# Patient Record
Sex: FEMALE | Race: WHITE | NOT HISPANIC OR LATINO | Employment: OTHER | URBAN - METROPOLITAN AREA
[De-identification: names, ages, dates, MRNs, and addresses within clinical notes are randomized per-mention and may not be internally consistent; named-entity substitution may affect disease eponyms.]

---

## 2017-01-11 ENCOUNTER — CLINICAL SUPPORT (OUTPATIENT)
Dept: CARDIOLOGY | Facility: CLINIC | Age: 67
End: 2017-01-11
Payer: MEDICARE

## 2017-01-11 DIAGNOSIS — Z95.810 AUTOMATIC IMPLANTABLE CARDIOVERTER-DEFIBRILLATOR IN SITU: ICD-10-CM

## 2017-01-11 DIAGNOSIS — I47.20 VENTRICULAR TACHYARRHYTHMIA: ICD-10-CM

## 2017-01-11 PROCEDURE — 93295 DEV INTERROG REMOTE 1/2/MLT: CPT | Mod: ,,, | Performed by: INTERNAL MEDICINE

## 2017-01-11 PROCEDURE — 93296 REM INTERROG EVL PM/IDS: CPT | Mod: PBBFAC,PO | Performed by: INTERNAL MEDICINE

## 2017-01-13 RX ORDER — SITAGLIPTIN AND METFORMIN HYDROCHLORIDE 500; 50 MG/1; MG/1
TABLET, FILM COATED ORAL
Qty: 60 TABLET | Refills: 11 | Status: SHIPPED | OUTPATIENT
Start: 2017-01-13 | End: 2017-06-21 | Stop reason: SDUPTHER

## 2017-01-13 NOTE — TELEPHONE ENCOUNTER
----- Message from Kassandra Doyle sent at 1/13/2017  1:12 PM CST -----  Contact: Patient  Inge, patient [phone], Calling for a refill on Rx Janumet 50/500 mg. Please advise. Thanks.

## 2017-01-22 RX ORDER — RAMIPRIL 10 MG/1
CAPSULE ORAL
Qty: 90 CAPSULE | Refills: 4 | Status: SHIPPED | OUTPATIENT
Start: 2017-01-22 | End: 2017-03-20 | Stop reason: SDUPTHER

## 2017-01-22 RX ORDER — FENOFIBRIC ACID 135 MG/1
CAPSULE, DELAYED RELEASE ORAL
Qty: 90 CAPSULE | Refills: 3 | Status: SHIPPED | OUTPATIENT
Start: 2017-01-22 | End: 2017-06-02 | Stop reason: SDUPTHER

## 2017-02-08 RX ORDER — METFORMIN HYDROCHLORIDE 750 MG/1
750 TABLET, EXTENDED RELEASE ORAL NIGHTLY
Qty: 90 TABLET | Refills: 3 | Status: SHIPPED | OUTPATIENT
Start: 2017-02-08 | End: 2018-05-24 | Stop reason: SDUPTHER

## 2017-02-08 RX ORDER — VERAPAMIL HYDROCHLORIDE 240 MG/1
TABLET, FILM COATED, EXTENDED RELEASE ORAL
Qty: 180 TABLET | Refills: 4 | Status: SHIPPED | OUTPATIENT
Start: 2017-02-08 | End: 2018-05-24 | Stop reason: SDUPTHER

## 2017-02-08 RX ORDER — METOPROLOL SUCCINATE 200 MG/1
TABLET, EXTENDED RELEASE ORAL
Qty: 180 TABLET | Refills: 4 | Status: SHIPPED | OUTPATIENT
Start: 2017-02-08 | End: 2019-05-02 | Stop reason: SDUPTHER

## 2017-02-15 ENCOUNTER — PATIENT OUTREACH (OUTPATIENT)
Dept: ADMINISTRATIVE | Facility: HOSPITAL | Age: 67
End: 2017-02-15

## 2017-02-15 NOTE — LETTER
February 15, 2017    Inge FARRAR Saleem  [address] [facility] [place] [address]             [facility]  [address]  [facility] [address]  Phone: [phone] Dear Mrs. Monge:    [facility] is committed to your overall health.  To help you get the most out of each of your visits, we will review your information to make sure you are up to date on all of your recommended tests and/or procedures.      Dr. Davis        has found that you may be due for:    Mammogram  Influenza vaccine    REMINDER:  lab appointment  2/27/17  Dr. Sneed (cardiology) on 5/9/17  at 11:00 am    Please bring your home blood pressure machine with you so that we may test for accuracy.     If you have had any of the above done at another facility, please bring the records or information with you so that your record at [facility] will be complete.     If you are currently taking medication , please bring it with you to your appointment for review.    If you have any questions or concerns, please don't hesitate to call.    Sincerely,  Leyda Mccall  Clinical Care Coordinator  [facility] [address] [facility] [address]  [place] [address]  Phone: [phone]   Fax: [phone]

## 2017-02-20 RX ORDER — FENOFIBRIC ACID 135 MG/1
CAPSULE, DELAYED RELEASE ORAL
Qty: 30 CAPSULE | Refills: 0 | Status: SHIPPED | OUTPATIENT
Start: 2017-02-20 | End: 2017-03-06 | Stop reason: SDUPTHER

## 2017-02-27 ENCOUNTER — LAB VISIT (OUTPATIENT)
Dept: LAB | Facility: HOSPITAL | Age: 67
End: 2017-02-27
Attending: FAMILY MEDICINE
Payer: MEDICARE

## 2017-02-27 DIAGNOSIS — E11.9 TYPE 2 DIABETES MELLITUS WITHOUT COMPLICATION, WITHOUT LONG-TERM CURRENT USE OF INSULIN: ICD-10-CM

## 2017-02-27 LAB
ALBUMIN SERPL BCP-MCNC: 3.9 G/DL
ALP SERPL-CCNC: 29 U/L
ALT SERPL W/O P-5'-P-CCNC: 19 U/L
ANION GAP SERPL CALC-SCNC: 8 MMOL/L
AST SERPL-CCNC: 23 U/L
BILIRUB SERPL-MCNC: 0.3 MG/DL
BUN SERPL-MCNC: 8 MG/DL
CALCIUM SERPL-MCNC: 8.7 MG/DL
CHLORIDE SERPL-SCNC: 109 MMOL/L
CHOLEST/HDLC SERPL: 5.6 {RATIO}
CO2 SERPL-SCNC: 25 MMOL/L
CREAT SERPL-MCNC: 0.7 MG/DL
EST. GFR  (AFRICAN AMERICAN): >60 ML/MIN/1.73 M^2
EST. GFR  (NON AFRICAN AMERICAN): >60 ML/MIN/1.73 M^2
GLUCOSE SERPL-MCNC: 120 MG/DL
HDL/CHOLESTEROL RATIO: 17.9 %
HDLC SERPL-MCNC: 195 MG/DL
HDLC SERPL-MCNC: 35 MG/DL
LDLC SERPL CALC-MCNC: ABNORMAL MG/DL
NONHDLC SERPL-MCNC: 160 MG/DL
POTASSIUM SERPL-SCNC: 3.9 MMOL/L
PROT SERPL-MCNC: 6.8 G/DL
SODIUM SERPL-SCNC: 142 MMOL/L
TRIGL SERPL-MCNC: 568 MG/DL

## 2017-02-27 PROCEDURE — 80061 LIPID PANEL: CPT

## 2017-02-27 PROCEDURE — 80053 COMPREHEN METABOLIC PANEL: CPT

## 2017-02-27 PROCEDURE — 83036 HEMOGLOBIN GLYCOSYLATED A1C: CPT

## 2017-02-27 PROCEDURE — 36415 COLL VENOUS BLD VENIPUNCTURE: CPT | Mod: PO

## 2017-02-28 LAB
ESTIMATED AVG GLUCOSE: 117 MG/DL
HBA1C MFR BLD HPLC: 5.7 %

## 2017-03-06 ENCOUNTER — OFFICE VISIT (OUTPATIENT)
Dept: FAMILY MEDICINE | Facility: CLINIC | Age: 67
End: 2017-03-06
Payer: MEDICARE

## 2017-03-06 VITALS
DIASTOLIC BLOOD PRESSURE: 70 MMHG | SYSTOLIC BLOOD PRESSURE: 110 MMHG | TEMPERATURE: 98 F | WEIGHT: 159.38 LBS | BODY MASS INDEX: 26.55 KG/M2 | HEIGHT: 65 IN | HEART RATE: 80 BPM | OXYGEN SATURATION: 95 %

## 2017-03-06 DIAGNOSIS — Z12.39 BREAST CANCER SCREENING: ICD-10-CM

## 2017-03-06 DIAGNOSIS — I10 ESSENTIAL HYPERTENSION: Primary | ICD-10-CM

## 2017-03-06 DIAGNOSIS — E78.5 HYPERLIPIDEMIA, UNSPECIFIED HYPERLIPIDEMIA TYPE: ICD-10-CM

## 2017-03-06 DIAGNOSIS — E11.9 TYPE 2 DIABETES MELLITUS WITHOUT COMPLICATION, WITHOUT LONG-TERM CURRENT USE OF INSULIN: ICD-10-CM

## 2017-03-06 DIAGNOSIS — I25.10 CORONARY ARTERY DISEASE INVOLVING NATIVE CORONARY ARTERY OF NATIVE HEART WITHOUT ANGINA PECTORIS: ICD-10-CM

## 2017-03-06 DIAGNOSIS — Z12.31 ENCOUNTER FOR SCREENING MAMMOGRAM FOR MALIGNANT NEOPLASM OF BREAST: ICD-10-CM

## 2017-03-06 PROCEDURE — 99999 PR PBB SHADOW E&M-EST. PATIENT-LVL III: CPT | Mod: PBBFAC,,, | Performed by: FAMILY MEDICINE

## 2017-03-06 PROCEDURE — 99214 OFFICE O/P EST MOD 30 MIN: CPT | Mod: S$PBB,,, | Performed by: FAMILY MEDICINE

## 2017-03-06 PROCEDURE — 99213 OFFICE O/P EST LOW 20 MIN: CPT | Mod: PBBFAC,PO | Performed by: FAMILY MEDICINE

## 2017-03-06 NOTE — MR AVS SNAPSHOT
[facility]  [address] [facility] [address]  [facility] [address]  Phone: [phone]  Fax: [phone]                  Inge Monge   3/6/2017 10:20 AM   Office Visit    Description:  Female : 1950   Provider:  Roverto Davis MD   Department:  [facility]           Reason for Visit     Diabetes     Hypertension     Cough           Diagnoses this Visit        Comments    Essential hypertension    -  Primary     Hyperlipidemia, unspecified hyperlipidemia type         Type 2 diabetes mellitus without complication, without long-term current use of insulin         Coronary artery disease involving native coronary artery of native heart without angina pectoris         Breast cancer screening         Encounter for screening mammogram for malignant neoplasm of breast                To Do List           Future Appointments        Provider Department Dept Phone    3/16/2017 10:30 AM [facility] MAMMO1 [facility] [phone]    2017 10:30 AM [facility]ER [facility] [phone]    2017 11:00 AM Rony Sneed MD [facility] [phone]    2017 8:10 AM LAB, [place] [facility] [phone]    2017 10:20 AM Roverto Davis MD [facility] [phone]      Goals (5 Years of Data)     None      Follow-Up and Disposition     Return in about 6 months (around 2017).    Follow-up and Disposition History      [facility]     [facility] Care [facility] -  Assistance  Registered nurses in the [facility] provide clinical advisement, health education, appointment booking, and other advisory services.  Call for this free service at [phone].             Medications           Message regarding Medications     Verify the changes and/or additions to your medication regime listed below are the same as discussed with your clinician today.  If any of these changes or  "additions are incorrect, please notify your healthcare provider.             Verify that the below list of medications is an accurate representation of the medications you are currently taking.  If none reported, the list may be blank. If incorrect, please contact your healthcare provider. Carry this list with you in case of emergency.           Current Medications     albuterol (VENTOLIN HFA) 90 mcg/actuation inhaler Inhale 1 puff into the lungs daily as needed.    butalbital-acetaminophen-caffeine -40 mg (FIORICET, ESGIC) -40 mg per tablet Take 1 tablet by mouth every 6 (six) hours as needed.    CONTOUR TEST STRIPS Strp TEST BLOOD SUGARS 4 TIMES DAILY    cyclobenzaprine (FLEXERIL) 10 MG tablet Take 1 tablet (10 mg total) by mouth every 8 (eight) hours.    fenofibric acid (FIBRICOR) 135 mg CpDR TAKE 1 CAPSULE BY MOUTH EVERY DAY    guaifenesin (MUCINEX) 600 mg 12 hr tablet Take 1,200 mg by mouth 2 (two) times daily.    JANUMET  mg per tablet TAKE 1 TABLET BY MOUTH TWICE DAILY    metformin (GLUCOPHAGE-XR) 750 MG 24 hr tablet Take 1 tablet (750 mg total) by mouth every evening.    metoprolol succinate (TOPROL-XL) 200 MG 24 hr tablet TAKE 1 TABLET(200 MG) BY MOUTH TWICE DAILY    pravastatin (PRAVACHOL) 20 MG tablet Take 1 tablet (20 mg total) by mouth every evening.    rabeprazole (ACIPHEX) 20 mg tablet TAKE 1 TABLET BY MOUTH DAILY    ramipril (ALTACE) 10 MG capsule TAKE 1 CAPSULE(10 MG) BY MOUTH EVERY DAY    ramipril (ALTACE) 10 MG capsule TAKE 1 CAPSULE(10 MG) BY MOUTH EVERY DAY    verapamil (CALAN-SR) 240 MG CR tablet TAKE 1 TABLET(240 MG) BY MOUTH TWICE DAILY    fluticasone (FLONASE) 50 mcg/actuation nasal spray 2 sprays by Each Nare route 2 (two) times daily.           Clinical Reference Information           Your Vitals Were     BP Pulse Temp Height Weight SpO2    110/70 (BP Location: Left arm, Patient Position: Sitting, BP Method: Manual) 80 97.7 °F (36.5 °C) (Oral) 5' 5" (1.651 m) 72.3 kg (159 " lb 6.3 oz) 95%    BMI                26.52 kg/m2          Blood Pressure          Most Recent Value    BP  110/70      Allergies as of 3/6/2017     Sulfa (Sulfonamide Antibiotics)      Immunizations Administered on Date of Encounter - 3/6/2017     None      Orders Placed During Today's Visit     Future Labs/Procedures Expected by Expires    Mammo Digital Screening Bilat with CAD  3/6/2017 5/6/2018    Comprehensive metabolic panel  9/2/2017 5/5/2018    Hemoglobin A1c  9/2/2017 3/6/2018    Lipid panel  9/2/2017 3/6/2018      [facility] Sign-Up     Activating your [facility] account is as easy as 1-2-3!     1) Visit [email or web address], select Sign Up Now, enter this activation code and your date of birth, then select Next.  [email or web address]  Expires: 4/20/2017 11:27 AM      2) Create a username and password to use when you visit [facility] in the future and select a security question in case you lose your password and select Next.    3) Enter your e-mail address and click Sign Up!    Additional Information  If you have questions, please e-mail [email or web address] or call [phone] to talk to our [facility] staff. Remember, [facility] is NOT to be used for urgent needs. For medical emergencies, dial 911.         Language Assistance Services     ATTENTION: Language assistance services are available, free of charge. Please call [phone].      ATENCIÓN: Si habla español, tiene a thomas disposición servicios gratuitos de asistencia lingüística. Llame al [phone].     CHÚ Ý: N?u b?n nói Ti?ng Vi?t, có các d?ch v? h? tr? ngôn ng? mi?n phí dành cho b?n. G?i s? [phone].         [facility] complies with applicable Federal civil rights laws and does not discriminate on the basis of race, color, national origin, age, disability, or sex.

## 2017-03-06 NOTE — PROGRESS NOTES
"Chief Complaint   Patient presents with    Diabetes     6 month follow up with labs prior    Hypertension    Cough     For about week       HISTORY OF PRESENT ILLNESS: This is a 66-year-old white female presents today as a 6 month followup regarding her diabetes.   She is recovering from URI over the last week. She is improving. Some central chest congestion.  DM2 - doing well on present regimen of janumet twice a day and metformin 750mg QHS; BG's 100; lost 14 pounds since last appt  HLD - tolerating Trilipix and Pravastatin 10mg daily  HTN - tolerating Ramipril 10mg daily , metoprolol and Verapamil; using lasix as needed but rarely  CAD - following with Dr. Masterson  COPD - stable off Advair and Spiriva; using albuterol   GERD - taking Aciphex daily    PAST MEDICAL HISTORY:   Type 2 diabetes, coronary artery disease, COPD,   hypertension, mitral valve regurgitation, hypertrophic cardiomyopathy s/p dual chamber,   hypertriglyceridemia, peptic ulcer disease, osteopenia, elevated CPK (worse with statins)sz and colon polyps.     REVIEW OF SYSTEMS: She denies any chest pain, shortness of breath,   dizziness, palpitations, and lower extremity swelling.   Occ sputum production    PHYSICAL EXAM:   /70 (BP Location: Left arm, Patient Position: Sitting, BP Method: Manual)  Pulse 80  Temp 97.7 °F (36.5 °C) (Oral)   Ht 5' 5" (1.651 m)  Wt 72.3 kg (159 lb 6.3 oz)  SpO2 95%  BMI 26.52 kg/m2  GENERAL: A healthy-appearing 66-year-old white female, who sitting   upright, in no apparent distress, alert and oriented x4.   Posterior oropharynx is clear.   NECK: Supple. There is no lymphadenopathy, thyromegaly or JVD.   CHEST: Clear to auscultation bilaterally with good respiratory movement.   CARDIOVASCULAR: S1 and S2. Regular rate and rhythm. No murmurs, rubs or gallops.   ABDOMEN: Benign.   EXTREMITIES: Show no cyanosis, clubbing or edema.     Results for orders placed or performed in visit on 02/27/17   Hemoglobin A1c "   Result Value Ref Range    Hemoglobin A1C 5.7 4.5 - 6.2 %    Estimated Avg Glucose 117 68 - 131 mg/dL   Comprehensive metabolic panel   Result Value Ref Range    Sodium 142 136 - 145 mmol/L    Potassium 3.9 3.5 - 5.1 mmol/L    Chloride 109 95 - 110 mmol/L    CO2 25 23 - 29 mmol/L    Glucose 120 (H) 70 - 110 mg/dL    BUN, Bld 8 8 - 23 mg/dL    Creatinine 0.7 0.5 - 1.4 mg/dL    Calcium 8.7 8.7 - 10.5 mg/dL    Total Protein 6.8 6.0 - 8.4 g/dL    Albumin 3.9 3.5 - 5.2 g/dL    Total Bilirubin 0.3 0.1 - 1.0 mg/dL    Alkaline Phosphatase 29 (L) 55 - 135 U/L    AST 23 10 - 40 U/L    ALT 19 10 - 44 U/L    Anion Gap 8 8 - 16 mmol/L    eGFR if African American >60.0 >60 mL/min/1.73 m^2    eGFR if non African American >60.0 >60 mL/min/1.73 m^2   Lipid panel   Result Value Ref Range    Cholesterol 195 120 - 199 mg/dL    Triglycerides 568 (H) 30 - 150 mg/dL    HDL 35 (L) 40 - 75 mg/dL    LDL Cholesterol Invalid, Trig>400.0 63.0 - 159.0 mg/dL    HDL/Chol Ratio 17.9 (L) 20.0 - 50.0 %    Total Cholesterol/HDL Ratio 5.6 (H) 2.0 - 5.0    Non-HDL Cholesterol 160 mg/dL     Essential hypertension    Hyperlipidemia, unspecified hyperlipidemia type    Type 2 diabetes mellitus without complication, without long-term current use of insulin  -     Hemoglobin A1c; Future; Expected date: 9/2/17  -     Comprehensive metabolic panel; Future; Expected date: 9/2/17  -     Lipid panel; Future; Expected date: 9/2/17    Coronary artery disease involving native coronary artery of native heart without angina pectoris    Breast cancer screening  -     Mammo Digital Screening Bilat with CAD; Future; Expected date: 3/6/17    Encounter for screening mammogram for malignant neoplasm of breast   -     Mammo Digital Screening Bilat with CAD; Future; Expected date: 3/6/17    Other orders  -     Cancel: Hepatitis C antibody; Future; Expected date: 3/6/17        F/u with pulmonology and cardiology as planned  continue Trilipix,  pravastatin 20mg daily; Krill  fish oil 1,000mg daily  Continue present meds  continue diabetic diet efforts and present meds  F/u 6 months with labs.

## 2017-03-20 ENCOUNTER — TELEPHONE (OUTPATIENT)
Dept: FAMILY MEDICINE | Facility: CLINIC | Age: 67
End: 2017-03-20

## 2017-03-20 RX ORDER — RAMIPRIL 10 MG/1
10 CAPSULE ORAL DAILY
Qty: 90 CAPSULE | Refills: 1 | Status: SHIPPED | OUTPATIENT
Start: 2017-03-20 | End: 2017-09-12 | Stop reason: SDUPTHER

## 2017-03-20 RX ORDER — PRAVASTATIN SODIUM 20 MG/1
20 TABLET ORAL NIGHTLY
Qty: 90 TABLET | Refills: 1 | Status: SHIPPED | OUTPATIENT
Start: 2017-03-20 | End: 2017-06-02 | Stop reason: SDUPTHER

## 2017-03-20 RX ORDER — PRAVASTATIN SODIUM 20 MG/1
TABLET ORAL
Qty: 31 TABLET | Refills: 0 | Status: SHIPPED | OUTPATIENT
Start: 2017-03-20 | End: 2018-10-26

## 2017-03-20 RX ORDER — RAMIPRIL 10 MG/1
CAPSULE ORAL
Qty: 30 CAPSULE | Refills: 0 | Status: SHIPPED | OUTPATIENT
Start: 2017-03-20 | End: 2018-02-08

## 2017-03-20 NOTE — TELEPHONE ENCOUNTER
----- Message from Dora Montague sent at 3/20/2017 12:30 PM CDT -----  Contact: Rolanda soriano/[facility] [employer] [phone]  They have a few more questions before they can complete the prior auth.  Please call her.  Thank you!

## 2017-03-20 NOTE — TELEPHONE ENCOUNTER
----- Message from Desiree Ortega sent at 3/20/2017 10:06 AM CDT -----  Contact: Patient  Patient called advising that she needs of refill of ramipril (ALTACE) 10 MG capsule and pravastatin (PRAVACHOL) 20 MG tablet sent to the 24 hour at [facility]- pharmacy phone number [phone].  Please call patient back at [phone] once refill has been sent over.  Thank you!

## 2017-03-22 RX ORDER — FENOFIBRIC ACID 135 MG/1
CAPSULE, DELAYED RELEASE ORAL
Qty: 30 CAPSULE | Refills: 0 | Status: SHIPPED | OUTPATIENT
Start: 2017-03-22 | End: 2017-04-20 | Stop reason: SDUPTHER

## 2017-04-10 ENCOUNTER — TELEPHONE (OUTPATIENT)
Dept: CARDIOLOGY | Facility: CLINIC | Age: 67
End: 2017-04-10

## 2017-04-10 RX ORDER — METOPROLOL SUCCINATE 200 MG/1
TABLET, EXTENDED RELEASE ORAL
Qty: 60 TABLET | Refills: 0 | Status: SHIPPED | OUTPATIENT
Start: 2017-04-10 | End: 2017-05-07 | Stop reason: SDUPTHER

## 2017-04-10 RX ORDER — VERAPAMIL HYDROCHLORIDE 240 MG/1
TABLET, FILM COATED, EXTENDED RELEASE ORAL
Qty: 60 TABLET | Refills: 0 | Status: SHIPPED | OUTPATIENT
Start: 2017-04-10 | End: 2017-05-07 | Stop reason: SDUPTHER

## 2017-04-10 NOTE — TELEPHONE ENCOUNTER
----- Message from Kassandra Doyle sent at 4/10/2017  2:18 PM CDT -----  Contact: Patient  Inge, patient [phone], Calling about Rx Varpamil and Metoporal.     [facility] [address] [place] [address]  Phone: [phone] Fax: [phone]    Pharmacy is claiming they do NOT have the Rx (stated it has been over a year since they have received a Rx) and patient needs this medication for her heart, today. Please advise. Thanks.

## 2017-04-10 NOTE — TELEPHONE ENCOUNTER
Left message for pt, notifying that she can [facility] and they will have her prescription on file and she can refill it at any [facility] she would like

## 2017-04-10 NOTE — TELEPHONE ENCOUNTER
----- Message from Julianna Colmenares sent at 4/10/2017  9:17 AM CDT -----  Contact: pt [phone]  Patient called for a refill the pharmacy sent the request to your office yesterday. She is out of the medications.

## 2017-04-10 NOTE — TELEPHONE ENCOUNTER
----- Message from Julianna Colmenares sent at 4/10/2017  9:33 AM CDT -----  Contact: pt [phone]  Patient called back and asked if you will call her prescription into [facility] [place] the [facility] they do not have a prescription.

## 2017-04-10 NOTE — TELEPHONE ENCOUNTER
Spoke with patient about refill request. Patient requested refill on metoprolol. Upon review of medications, pateint was notified she should have refills for the year. Patient understood and will call back if she has any problems.

## 2017-04-20 RX ORDER — FENOFIBRIC ACID 135 MG/1
CAPSULE, DELAYED RELEASE ORAL
Qty: 30 CAPSULE | Refills: 0 | Status: SHIPPED | OUTPATIENT
Start: 2017-04-20 | End: 2018-02-08

## 2017-05-08 RX ORDER — METOPROLOL SUCCINATE 200 MG/1
TABLET, EXTENDED RELEASE ORAL
Qty: 60 TABLET | Refills: 0 | Status: SHIPPED | OUTPATIENT
Start: 2017-05-08 | End: 2018-02-08

## 2017-05-08 RX ORDER — VERAPAMIL HYDROCHLORIDE 240 MG/1
TABLET, FILM COATED, EXTENDED RELEASE ORAL
Qty: 60 TABLET | Refills: 0 | Status: SHIPPED | OUTPATIENT
Start: 2017-05-08 | End: 2017-06-02 | Stop reason: SDUPTHER

## 2017-05-09 ENCOUNTER — OFFICE VISIT (OUTPATIENT)
Dept: CARDIOLOGY | Facility: CLINIC | Age: 67
End: 2017-05-09
Payer: MEDICARE

## 2017-05-09 ENCOUNTER — CLINICAL SUPPORT (OUTPATIENT)
Dept: CARDIOLOGY | Facility: CLINIC | Age: 67
End: 2017-05-09
Payer: MEDICARE

## 2017-05-09 VITALS
BODY MASS INDEX: 26.81 KG/M2 | DIASTOLIC BLOOD PRESSURE: 83 MMHG | WEIGHT: 160.94 LBS | SYSTOLIC BLOOD PRESSURE: 128 MMHG | HEART RATE: 77 BPM | HEIGHT: 65 IN

## 2017-05-09 DIAGNOSIS — I25.10 CORONARY ARTERY DISEASE INVOLVING NATIVE CORONARY ARTERY OF NATIVE HEART WITHOUT ANGINA PECTORIS: Primary | ICD-10-CM

## 2017-05-09 DIAGNOSIS — E11.9 TYPE 2 DIABETES MELLITUS WITHOUT COMPLICATION, WITHOUT LONG-TERM CURRENT USE OF INSULIN: ICD-10-CM

## 2017-05-09 DIAGNOSIS — E78.5 HYPERLIPIDEMIA, UNSPECIFIED HYPERLIPIDEMIA TYPE: ICD-10-CM

## 2017-05-09 DIAGNOSIS — I47.20 VENTRICULAR TACHYARRHYTHMIA: ICD-10-CM

## 2017-05-09 DIAGNOSIS — Z95.810 AUTOMATIC IMPLANTABLE CARDIOVERTER-DEFIBRILLATOR IN SITU: ICD-10-CM

## 2017-05-09 DIAGNOSIS — I10 ESSENTIAL HYPERTENSION: ICD-10-CM

## 2017-05-09 DIAGNOSIS — I42.2 HYPERTROPHIC CARDIOMYOPATHY: ICD-10-CM

## 2017-05-09 PROCEDURE — 99214 OFFICE O/P EST MOD 30 MIN: CPT | Mod: S$PBB,,, | Performed by: INTERNAL MEDICINE

## 2017-05-09 PROCEDURE — 93283 PRGRMG EVAL IMPLANTABLE DFB: CPT | Mod: PBBFAC,PO | Performed by: INTERNAL MEDICINE

## 2017-05-09 PROCEDURE — 99999 PR PBB SHADOW E&M-EST. PATIENT-LVL II: CPT | Mod: PBBFAC,,, | Performed by: INTERNAL MEDICINE

## 2017-05-09 PROCEDURE — 99212 OFFICE O/P EST SF 10 MIN: CPT | Mod: PBBFAC,PO,25 | Performed by: INTERNAL MEDICINE

## 2017-05-09 NOTE — PROGRESS NOTES
Subjective:    Patient ID:  Inge Monge is a 67 y.o. female who presents for follow-up of CAD    HPI  She comes with no complaints, no chest pain, no shortness of breath      Review of Systems   Constitution: Negative for decreased appetite, weakness, malaise/fatigue, weight gain and weight loss.   Cardiovascular: Negative for chest pain, dyspnea on exertion, leg swelling, palpitations and syncope.   Respiratory: Negative for cough and shortness of breath.    Gastrointestinal: Negative.    All other systems reviewed and are negative.       Objective:    Physical Exam   Constitutional: She is oriented to person, place, and time. She appears well-developed and well-nourished.   HENT:   Head: Normocephalic.   Eyes: Pupils are equal, round, and reactive to light.   Neck: Normal range of motion. Neck supple. No JVD present. Carotid bruit is not present. No thyromegaly present.   Cardiovascular: Normal rate, regular rhythm, intact distal pulses and normal pulses.  PMI is not displaced.  Exam reveals no gallop.    Murmur heard.  High-pitched blowing holosystolic murmur is present with a grade of 2/6  at the apex  Pulmonary/Chest: Effort normal and breath sounds normal.   Abdominal: Soft. Normal appearance. She exhibits no mass. There is no hepatosplenomegaly. There is no tenderness.   Musculoskeletal: Normal range of motion. She exhibits no edema.   Neurological: She is alert and oriented to person, place, and time. She has normal strength and normal reflexes. No sensory deficit.   Skin: Skin is warm and intact.   Psychiatric: She has a normal mood and affect.   Nursing note and vitals reviewed.        Assessment:       1. Coronary artery disease involving native coronary artery of native heart without angina pectoris    2. Hypertrophic cardiomyopathy    3. Essential hypertension    4. Hyperlipidemia, unspecified hyperlipidemia type    5. Type 2 diabetes mellitus without complication, without long-term current use of  insulin    6. Automatic implantable cardioverter-defibrillator in situ         Plan:     Continue all cardiac medications  Regular exercise program  9 m f/u with [facility]

## 2017-05-11 ENCOUNTER — TELEPHONE (OUTPATIENT)
Dept: CARDIOLOGY | Facility: CLINIC | Age: 67
End: 2017-05-11

## 2017-05-11 NOTE — TELEPHONE ENCOUNTER
----- Message from Krystyna Thornton sent at 5/11/2017 10:46 AM CDT -----  Patient was just in to see doctor and she is stating that she forgot to get the doctor to refill her prescriptions of Verapamil 240 mg and Metoprolol 200 mg. She is calling pharmacy to send the request but she is out of her medication. She is just making sure that office refills it as soon as possible. Please call back after completion at [phone]. Please remit to:      [email or web address] [id number] [place] [facility]  [address] [facility] [address]  [facility] [place] [address]  Phone: [phone] Fax: [phone]

## 2017-06-02 NOTE — TELEPHONE ENCOUNTER
----- Message from Anjali Lees sent at 6/2/2017 10:29 AM CDT -----  Contact: [facility]#[phone]    Calling to  Discuss  The    meds  Refills  On /fdnofridric 135  Mg //verapamil er 240  Mg //metoprolol er succinate 200 mg // please call   [id number] - [place] [address]  [address] [facility] [address]  [address] [place] [address]  Phone: [phone] Fax: [phone]

## 2017-06-04 RX ORDER — METOPROLOL SUCCINATE 200 MG/1
TABLET, EXTENDED RELEASE ORAL
Qty: 60 TABLET | Refills: 0 | Status: SHIPPED | OUTPATIENT
Start: 2017-06-04 | End: 2017-07-02 | Stop reason: SDUPTHER

## 2017-06-04 RX ORDER — VERAPAMIL HYDROCHLORIDE 240 MG/1
TABLET, FILM COATED, EXTENDED RELEASE ORAL
Qty: 60 TABLET | Refills: 0 | Status: SHIPPED | OUTPATIENT
Start: 2017-06-04 | End: 2017-07-02 | Stop reason: SDUPTHER

## 2017-06-04 RX ORDER — FENOFIBRIC ACID 135 MG/1
1 CAPSULE, DELAYED RELEASE ORAL DAILY
Qty: 90 CAPSULE | Refills: 3 | Status: SHIPPED | OUTPATIENT
Start: 2017-06-04 | End: 2018-05-24 | Stop reason: SDUPTHER

## 2017-06-04 RX ORDER — PRAVASTATIN SODIUM 20 MG/1
20 TABLET ORAL NIGHTLY
Qty: 90 TABLET | Refills: 1 | Status: SHIPPED | OUTPATIENT
Start: 2017-06-04 | End: 2018-02-08

## 2017-06-12 RX ORDER — CYCLOBENZAPRINE HCL 10 MG
TABLET ORAL
Qty: 90 TABLET | Refills: 0 | Status: SHIPPED | OUTPATIENT
Start: 2017-06-12 | End: 2017-07-13 | Stop reason: SDUPTHER

## 2017-07-05 RX ORDER — VERAPAMIL HYDROCHLORIDE 240 MG/1
TABLET, FILM COATED, EXTENDED RELEASE ORAL
Qty: 60 TABLET | Refills: 0 | Status: SHIPPED | OUTPATIENT
Start: 2017-07-05 | End: 2017-08-13 | Stop reason: SDUPTHER

## 2017-07-05 RX ORDER — METOPROLOL SUCCINATE 200 MG/1
TABLET, EXTENDED RELEASE ORAL
Qty: 60 TABLET | Refills: 0 | Status: SHIPPED | OUTPATIENT
Start: 2017-07-05 | End: 2017-08-13 | Stop reason: SDUPTHER

## 2017-07-13 RX ORDER — CYCLOBENZAPRINE HCL 10 MG
TABLET ORAL
Qty: 90 TABLET | Refills: 5 | Status: SHIPPED | OUTPATIENT
Start: 2017-07-13 | End: 2018-01-16 | Stop reason: SDUPTHER

## 2017-08-10 ENCOUNTER — DOCUMENTATION ONLY (OUTPATIENT)
Dept: CARDIOLOGY | Facility: CLINIC | Age: 67
End: 2017-08-10

## 2017-08-10 DIAGNOSIS — I42.2 HYPERTROPHIC CARDIOMYOPATHY: Primary | ICD-10-CM

## 2017-08-10 DIAGNOSIS — I10 ESSENTIAL HYPERTENSION: ICD-10-CM

## 2017-08-10 DIAGNOSIS — I25.10 CORONARY ARTERY DISEASE INVOLVING NATIVE CORONARY ARTERY OF NATIVE HEART WITHOUT ANGINA PECTORIS: ICD-10-CM

## 2017-08-10 NOTE — PROGRESS NOTES
Received alert via home monitor for NSVT.  IEGM showed VT ~151bpm on 8/9/17 02:02, lasting 20+ sec.   Spoke w/ patient, she is not sure if she got up during the night, but was not aware of anything out of the ordinary.  However, she did not feel well yesterday and today (stomach ache & diarrhea).  RV pacing has increased from 0% to 51% since last check in May '17.  Reviewed w/ Dr. Sneed, will have patient scheduled for Jennifer scan and echo then consult with Dr. Medina.  Pt notified and verbalized understanding.  Information to Nadege for scheduling.

## 2017-08-14 RX ORDER — VERAPAMIL HYDROCHLORIDE 240 MG/1
TABLET, FILM COATED, EXTENDED RELEASE ORAL
Qty: 60 TABLET | Refills: 0 | Status: SHIPPED | OUTPATIENT
Start: 2017-08-14 | End: 2017-09-12 | Stop reason: SDUPTHER

## 2017-08-14 RX ORDER — METOPROLOL SUCCINATE 200 MG/1
TABLET, EXTENDED RELEASE ORAL
Qty: 60 TABLET | Refills: 0 | Status: SHIPPED | OUTPATIENT
Start: 2017-08-14 | End: 2017-09-12 | Stop reason: SDUPTHER

## 2017-08-15 ENCOUNTER — CLINICAL SUPPORT (OUTPATIENT)
Dept: CARDIOLOGY | Facility: CLINIC | Age: 67
End: 2017-08-15
Payer: MEDICARE

## 2017-08-15 DIAGNOSIS — I47.20 VENTRICULAR TACHYARRHYTHMIA: ICD-10-CM

## 2017-08-15 DIAGNOSIS — Z95.810 AUTOMATIC IMPLANTABLE CARDIOVERTER-DEFIBRILLATOR IN SITU: ICD-10-CM

## 2017-08-15 DIAGNOSIS — Z95.810 AUTOMATIC IMPLANTABLE CARDIOVERTER-DEFIBRILLATOR IN SITU: Primary | ICD-10-CM

## 2017-08-15 PROCEDURE — 93295 DEV INTERROG REMOTE 1/2/MLT: CPT | Mod: ,,, | Performed by: INTERNAL MEDICINE

## 2017-08-15 PROCEDURE — 93296 REM INTERROG EVL PM/IDS: CPT | Mod: PBBFAC,PO | Performed by: INTERNAL MEDICINE

## 2017-08-22 ENCOUNTER — PATIENT OUTREACH (OUTPATIENT)
Dept: ADMINISTRATIVE | Facility: HOSPITAL | Age: 67
End: 2017-08-22

## 2017-08-22 NOTE — LETTER
August 22, 2017    Inge Monge  [address]  [place] [address]             [facility]  [address]  [facility] [address]  Phone: [phone] Dear Ms. Monge:    [facility] committed to your overall health.  To help you get the most out of each of your visits, we will review your information to make sure you are up to date on all of your recommended tests and/or procedures.      Dr. Davis       has found that you may be due for:    colonoscopy  Mammogram  Annual Dilated Eye Exam  Diabetic Foot Exam    REMINDER:  lab appointment  8/30/17    If you have had any of the above done at another facility, please bring the records or information with you so that your record at [facility] will be complete.     If you are currently taking medication, please bring it with you to your appointment for review.    If you have any questions or concerns, please don't hesitate to call.    Sincerely,    Leyda Mccall  Clinical Care Coordinator  [facility]  [address] [facility] [address]  [place] [address]  Phone: [phone]   Fax: [phone]

## 2017-09-08 RX ORDER — RABEPRAZOLE SODIUM 20 MG/1
TABLET, DELAYED RELEASE ORAL
Qty: 90 TABLET | Refills: 3 | Status: SHIPPED | OUTPATIENT
Start: 2017-09-08 | End: 2018-09-26 | Stop reason: SDUPTHER

## 2017-09-12 RX ORDER — METOPROLOL SUCCINATE 200 MG/1
TABLET, EXTENDED RELEASE ORAL
Qty: 60 TABLET | Refills: 0 | Status: SHIPPED | OUTPATIENT
Start: 2017-09-12 | End: 2017-10-11 | Stop reason: SDUPTHER

## 2017-09-12 RX ORDER — RAMIPRIL 10 MG/1
CAPSULE ORAL
Qty: 90 CAPSULE | Refills: 0 | Status: SHIPPED | OUTPATIENT
Start: 2017-09-12 | End: 2017-12-10 | Stop reason: SDUPTHER

## 2017-09-12 RX ORDER — VERAPAMIL HYDROCHLORIDE 240 MG/1
TABLET, FILM COATED, EXTENDED RELEASE ORAL
Qty: 60 TABLET | Refills: 0 | Status: SHIPPED | OUTPATIENT
Start: 2017-09-12 | End: 2017-10-11 | Stop reason: SDUPTHER

## 2017-09-18 RX ORDER — BUTALBITAL, ACETAMINOPHEN AND CAFFEINE 50; 325; 40 MG/1; MG/1; MG/1
TABLET ORAL
Qty: 120 TABLET | Refills: 1 | Status: SHIPPED | OUTPATIENT
Start: 2017-09-18 | End: 2017-11-19 | Stop reason: SDUPTHER

## 2017-10-11 RX ORDER — VERAPAMIL HYDROCHLORIDE 240 MG/1
TABLET, FILM COATED, EXTENDED RELEASE ORAL
Qty: 180 TABLET | Refills: 0 | Status: SHIPPED | OUTPATIENT
Start: 2017-10-11 | End: 2018-02-05 | Stop reason: SDUPTHER

## 2017-10-11 RX ORDER — METOPROLOL SUCCINATE 200 MG/1
TABLET, EXTENDED RELEASE ORAL
Qty: 180 TABLET | Refills: 0 | Status: SHIPPED | OUTPATIENT
Start: 2017-10-11 | End: 2018-02-05 | Stop reason: SDUPTHER

## 2017-11-19 RX ORDER — BUTALBITAL, ACETAMINOPHEN AND CAFFEINE 50; 325; 40 MG/1; MG/1; MG/1
TABLET ORAL
Qty: 120 TABLET | Refills: 5 | Status: SHIPPED | OUTPATIENT
Start: 2017-11-19 | End: 2019-03-27 | Stop reason: SDUPTHER

## 2017-12-11 RX ORDER — RAMIPRIL 10 MG/1
CAPSULE ORAL
Qty: 90 CAPSULE | Refills: 0 | Status: SHIPPED | OUTPATIENT
Start: 2017-12-11 | End: 2018-02-08

## 2017-12-18 ENCOUNTER — INITIAL CONSULT (OUTPATIENT)
Dept: CARDIOLOGY | Facility: CLINIC | Age: 67
End: 2017-12-18
Payer: MEDICARE

## 2017-12-18 ENCOUNTER — CLINICAL SUPPORT (OUTPATIENT)
Dept: CARDIOLOGY | Facility: CLINIC | Age: 67
End: 2017-12-18
Attending: INTERNAL MEDICINE
Payer: MEDICARE

## 2017-12-18 VITALS
BODY MASS INDEX: 26.81 KG/M2 | SYSTOLIC BLOOD PRESSURE: 141 MMHG | DIASTOLIC BLOOD PRESSURE: 78 MMHG | WEIGHT: 160.94 LBS | HEART RATE: 72 BPM | HEIGHT: 65 IN

## 2017-12-18 DIAGNOSIS — I47.20 VENTRICULAR TACHYARRHYTHMIA: ICD-10-CM

## 2017-12-18 DIAGNOSIS — Z95.810 AUTOMATIC IMPLANTABLE CARDIOVERTER-DEFIBRILLATOR IN SITU: ICD-10-CM

## 2017-12-18 DIAGNOSIS — I42.2 HYPERTROPHIC CARDIOMYOPATHY: ICD-10-CM

## 2017-12-18 DIAGNOSIS — I47.20 VENTRICULAR TACHYCARDIA: Primary | ICD-10-CM

## 2017-12-18 DIAGNOSIS — I10 ESSENTIAL HYPERTENSION: ICD-10-CM

## 2017-12-18 DIAGNOSIS — E11.9 TYPE 2 DIABETES MELLITUS WITHOUT RETINOPATHY: ICD-10-CM

## 2017-12-18 PROCEDURE — 93283 PRGRMG EVAL IMPLANTABLE DFB: CPT | Mod: PBBFAC,PO | Performed by: INTERNAL MEDICINE

## 2017-12-18 PROCEDURE — 99204 OFFICE O/P NEW MOD 45 MIN: CPT | Mod: S$PBB,,, | Performed by: INTERNAL MEDICINE

## 2017-12-18 PROCEDURE — 93005 ELECTROCARDIOGRAM TRACING: CPT | Mod: PBBFAC,PO | Performed by: INTERNAL MEDICINE

## 2017-12-18 PROCEDURE — 99213 OFFICE O/P EST LOW 20 MIN: CPT | Mod: PBBFAC,PO,25 | Performed by: INTERNAL MEDICINE

## 2017-12-18 PROCEDURE — 99999 PR PBB SHADOW E&M-EST. PATIENT-LVL III: CPT | Mod: PBBFAC,,, | Performed by: INTERNAL MEDICINE

## 2017-12-18 PROCEDURE — 93010 ELECTROCARDIOGRAM REPORT: CPT | Mod: S$PBB,,, | Performed by: INTERNAL MEDICINE

## 2017-12-18 NOTE — LETTER
December 18, 2017      Rony Sneed MD  [address] [facility] [address] [place] [address]           [facility]  [address] [facility] [address] [place] [address]  Phone: [phone]          Patient: Inge Monge   MR Number: [id number]   YOB: 1950   Date of Visit: 12/18/2017       Dear Dr. Rony Sened:    Thank you for referring Inge Monge to me for evaluation. Attached you will find relevant portions of my assessment and plan of care.    If you have questions, please do not hesitate to call me. I look forward to following Inge Monge along with you.    Sincerely,    Stiven Medina MD    Enclosure  CC:  No Recipients    If you would like to receive this communication electronically, please contact [email or web address] or [phone] to request more information on [employer] Link access.    For providers and/or their staff who would like to refer a patient to [facility], please contact us through our one-stop-shop provider referral line, [facility], at [phone].    If you feel you have received this communication in error or would no longer like to receive these types of communications, please e-mail [email or web address]

## 2017-12-18 NOTE — PROGRESS NOTES
Subjective:    Patient ID:  Inge Monge is a 67 y.o. female who presents for follow-up of Consult (Ref by Dr. Sneed - NSVT/RV pacing )      HPI 66 yo female with Hypertrophic cardiomyopathy, Htn, DM, ICD.  Primary cardiologist is Dr. Sneed.  Has seen Dr. Bernal in the past.  Carries diagnosis of hypertrophic cardiomyopathy.  Echo's have reported normal biventricular structure and function, last one 3/16.  Has ICD for primary prevention.  Device interrogations have recently revealed increase in % RV pacing to 97%, with progression to complete AV block. One episode of VT 8/9  at 151 bpm, lasting 18 seconds.  Has noted increasing fatigue over the past 3 months. Denies syncope, palpitations, shortness of breath.    Review of Systems   Constitution: Positive for malaise/fatigue. Negative for weakness.   Cardiovascular: Negative for chest pain, dyspnea on exertion, irregular heartbeat, leg swelling, near-syncope, orthopnea, palpitations, paroxysmal nocturnal dyspnea and syncope.   Respiratory: Negative for cough and shortness of breath.    Neurological: Negative for dizziness and light-headedness.   All other systems reviewed and are negative.       Objective:    Physical Exam   Constitutional: She is oriented to person, place, and time. She appears well-developed and well-nourished.   Eyes: Conjunctivae are normal. No scleral icterus.   Neck: No JVD present. No tracheal deviation present.   Cardiovascular: Normal rate and regular rhythm.  PMI is not displaced.    Pulmonary/Chest: Effort normal and breath sounds normal. No respiratory distress.   Abdominal: Soft. There is no hepatosplenomegaly. There is no tenderness.   Musculoskeletal: She exhibits no edema or tenderness.   Neurological: She is alert and oriented to person, place, and time.   Skin: Skin is warm and dry. No rash noted.   Psychiatric: She has a normal mood and affect. Her behavior is normal.         Assessment:       1. Ventricular tachycardia     2. Hypertrophic cardiomyopathy    3. Essential hypertension    4. Automatic implantable cardioverter-defibrillator in situ    5. Type 2 diabetes mellitus without retinopathy - Both Eyes         Plan:           One episode of monomorphic VT, lasting 18 seconds, asymptomatic.  There is reports of hypertrophic cardiomyopathy in the notes, but her echo's are being read as having no thickness.  I would like for her to see Dr. Priyanka Ott to assess whether she has HOCM, and if she does, if there is anything to do.  Given chronic RV pacing and increasing fatigue >> echo with color flow.  Given she had one episode of non sustained VT, and this was asymptomatic >> would defer addition of anti-arrhythmic.  Will lower monitor zone.  F/u in 6 months.

## 2017-12-19 DIAGNOSIS — I47.20 VENTRICULAR TACHYCARDIA: Primary | ICD-10-CM

## 2017-12-19 DIAGNOSIS — Q24.9 ADULT CONGENITAL HEART DISEASE: Primary | ICD-10-CM

## 2017-12-19 DIAGNOSIS — I50.9 CONGESTIVE HEART FAILURE, UNSPECIFIED CONGESTIVE HEART FAILURE CHRONICITY, UNSPECIFIED CONGESTIVE HEART FAILURE TYPE: ICD-10-CM

## 2018-01-08 ENCOUNTER — TELEPHONE (OUTPATIENT)
Dept: FAMILY MEDICINE | Facility: CLINIC | Age: 68
End: 2018-01-08

## 2018-01-08 NOTE — TELEPHONE ENCOUNTER
----- Message from Dora Webster sent at 1/8/2018 12:13 PM CST -----  Contact: self   Patient needs to get a copy of her medications and she will pick it up today   Please put with  so she can pick them up

## 2018-01-17 RX ORDER — CYCLOBENZAPRINE HCL 10 MG
TABLET ORAL
Qty: 90 TABLET | Refills: 5 | Status: SHIPPED | OUTPATIENT
Start: 2018-01-17 | End: 2019-03-27 | Stop reason: SDUPTHER

## 2018-02-05 RX ORDER — METOPROLOL SUCCINATE 200 MG/1
TABLET, EXTENDED RELEASE ORAL
Qty: 180 TABLET | Refills: 0 | Status: SHIPPED | OUTPATIENT
Start: 2018-02-05 | End: 2018-02-08

## 2018-02-05 RX ORDER — VERAPAMIL HYDROCHLORIDE 240 MG/1
TABLET, FILM COATED, EXTENDED RELEASE ORAL
Qty: 180 TABLET | Refills: 0 | Status: SHIPPED | OUTPATIENT
Start: 2018-02-05 | End: 2018-02-08

## 2018-02-08 ENCOUNTER — HOSPITAL ENCOUNTER (OUTPATIENT)
Dept: CARDIOLOGY | Facility: CLINIC | Age: 68
Discharge: HOME | End: 2018-02-08
Attending: INTERNAL MEDICINE
Payer: MEDICARE

## 2018-02-08 ENCOUNTER — INITIAL CONSULT (OUTPATIENT)
Dept: CARDIOLOGY | Facility: CLINIC | Age: 68
End: 2018-02-08
Payer: MEDICARE

## 2018-02-08 VITALS
SYSTOLIC BLOOD PRESSURE: 153 MMHG | OXYGEN SATURATION: 95 % | WEIGHT: 161.81 LBS | HEIGHT: 66 IN | DIASTOLIC BLOOD PRESSURE: 95 MMHG | BODY MASS INDEX: 26.01 KG/M2 | HEART RATE: 79 BPM

## 2018-02-08 DIAGNOSIS — I47.20 VENTRICULAR TACHYCARDIA: ICD-10-CM

## 2018-02-08 DIAGNOSIS — I42.2 HYPERTROPHIC CARDIOMYOPATHY: Primary | ICD-10-CM

## 2018-02-08 DIAGNOSIS — E78.5 HYPERLIPIDEMIA, UNSPECIFIED HYPERLIPIDEMIA TYPE: ICD-10-CM

## 2018-02-08 DIAGNOSIS — I10 ESSENTIAL HYPERTENSION: ICD-10-CM

## 2018-02-08 DIAGNOSIS — I25.10 CORONARY ARTERY DISEASE INVOLVING NATIVE CORONARY ARTERY OF NATIVE HEART WITHOUT ANGINA PECTORIS: ICD-10-CM

## 2018-02-08 DIAGNOSIS — Z95.810 AUTOMATIC IMPLANTABLE CARDIOVERTER-DEFIBRILLATOR IN SITU: ICD-10-CM

## 2018-02-08 DIAGNOSIS — I42.2 HYPERTROPHIC CARDIOMYOPATHY: ICD-10-CM

## 2018-02-08 DIAGNOSIS — E11.9 TYPE 2 DIABETES MELLITUS WITHOUT COMPLICATION, WITHOUT LONG-TERM CURRENT USE OF INSULIN: ICD-10-CM

## 2018-02-08 LAB
AORTIC VALVE REGURGITATION: NORMAL
ESTIMATED PA SYSTOLIC PRESSURE: 31.14
MITRAL VALVE REGURGITATION: NORMAL
RETIRED EF AND QEF - SEE NOTES: 50 (ref 55–65)
TRICUSPID VALVE REGURGITATION: NORMAL

## 2018-02-08 PROCEDURE — 99204 OFFICE O/P NEW MOD 45 MIN: CPT | Mod: S$PBB,,, | Performed by: INTERNAL MEDICINE

## 2018-02-08 PROCEDURE — 99999 PR PBB SHADOW E&M-EST. PATIENT-LVL IV: CPT | Mod: PBBFAC,,, | Performed by: INTERNAL MEDICINE

## 2018-02-08 PROCEDURE — 93303 ECHO TRANSTHORACIC: CPT | Mod: 26,S$PBB,, | Performed by: PEDIATRICS

## 2018-02-08 PROCEDURE — 1159F MED LIST DOCD IN RCRD: CPT | Mod: ,,, | Performed by: INTERNAL MEDICINE

## 2018-02-08 PROCEDURE — 93325 DOPPLER ECHO COLOR FLOW MAPG: CPT | Mod: PBBFAC | Performed by: PEDIATRICS

## 2018-02-08 PROCEDURE — 93320 DOPPLER ECHO COMPLETE: CPT | Mod: 26,S$PBB,, | Performed by: PEDIATRICS

## 2018-02-08 PROCEDURE — 99214 OFFICE O/P EST MOD 30 MIN: CPT | Mod: PBBFAC,25 | Performed by: INTERNAL MEDICINE

## 2018-02-08 PROCEDURE — 1126F AMNT PAIN NOTED NONE PRSNT: CPT | Mod: ,,, | Performed by: INTERNAL MEDICINE

## 2018-02-08 NOTE — LETTER
February 10, 2018      Stiven Medina MD  [address] [facility] [place] [address]           [facility]  [address]  [facility] [address]  Phone: [phone]          Patient: Inge Monge   MR Number: [id number]   YOB: 1950   Date of Visit: 2/8/2018       Dear Dr. Stiven Medina:    Thank you for referring Inge Monge to me for evaluation. Attached you will find relevant portions of my assessment and plan of care.    If you have questions, please do not hesitate to call me. I look forward to following Inge Monge along with you.    Sincerely,    Priyanka Ott MD    Enclosure  CC:  No Recipients    If you would like to receive this communication electronically, please contact [email or web address] or [phone] to request more information on [employer] Link access.    For providers and/or their staff who would like to refer a patient to [facility], please contact us through our one-stop-shop provider referral line, [facility], at [phone].    If you feel you have received this communication in error or would no longer like to receive these types of communications, please e-mail [email or web address]

## 2018-02-08 NOTE — ASSESSMENT & PLAN NOTE
"Circumcision  Date/Time: 2018   12:56 PM  Performed by: Ulises Montiel MD  Consent: Verbal consent obtained. Written consent obtained.  Risks and benefits: risks, benefits and alternatives were discussed  Consent given by: parent  Patient identity confirmed: arm band  Time out: Immediately prior to procedure a \"time out\" was called to verify the correct patient, procedure, equipment, support staff and site/side marked as required.  Anatomy: penis normal  Restraint: standard molded circumcision board  Pain Management: 1 mL 1% lidocaine  Clamp(s) used:  Gomco 1.1  Complications? None  Comments: EBL minimal.  Tolerated Procedure well.        " Hx of HOCM s/p ETOH ablation by Dr. Flaherty on March 13, 2007. There is akinesis of the ventricular septum. The LV function appears < 50 which is significant abnormal in patient's with HCM. Currently on echo she does not have any obstruction. She has poor insight into her CM. She was instructed to continue all her medications.

## 2018-02-08 NOTE — PROGRESS NOTES
"Referring Provider: Adam    Subjective:   Patient ID:  Inge Monge is a 67 y.o. female who presents for evaluation of HOCM      HPI Patient is seen in our Adult Congenital Heart Defect Clinic.  Ms. Monge was diagnosed with HOCM in Feb 2007 and subsequently underwent ETOH ablation with Dr. Flaherty on 3-13-07 decreasing her mean gradient from 43mmHg to 13.8 mmHg (records in [facility]). She then developed complete heart block and underwent PPM implant on 3-16-17. There is documentation in [facility] that she was having NSVT (see Dr. Sneed's note dated 7-30-10) and she underwent upgrade to AICD on 08-05-10. She continued to have episodes of NSVT on her AICD interrogations. She has been on metoprolol and verapamil for years. Her echo shows normal LVSF and normal septal wall thickness.     She recently had an 18 sec episode of monomorphic VT. We have been asked to comment on her HCM.     Congenital Heart History:  Hypertrophic CM s/p ETOH ablation by Dr. Flaherty on March 13, 2007 (see [facility])    Other Medical Problems  Type II DM on PO meds  Complete heart block s/p PPM in 2007, upgraded to AICD for NSVT in 2010.  Hx of "benign tumor behind heart" removed at [facility] when patient was 25 years old  CAD s/p LAD and RCA PCI in 2006  Hx of GIB/PUD - 2005  Moderate COPD, Hx of tobacco abuse (Quit 2006) - sees Dr. Plata      Cardiac Testing:  Echo 2/18/2018  IMPRESSION:  Pt with history of alcohol ablation for hypertrophic obstructive cardiomyopathy ?   AICD lead passing through right atrium and across tricuspid valve to the right ventricle with trivial to mild associated tricuspid insufficiency  Qualitative impression of normal right ventricular size and structure with good systolic function.    Right ventricular pressure estimated 32 mmHg plus right atrial pressure.  Areas of thinning and scarring along with thickened areas of the ventricular septum consistent with history of alcohol " ablation for hypertrophic cardiomyopathy.  There is no evidence of LV intracavitary turbulence or obstruction, LV outflow obstruction or obvious systolic anterior motion of the mitral valve.  Qualitatively borderline normal left ventricular systolic function with EF estimated 50-55 % from apical views    Review of Systems   Constitution: Negative for chills, diaphoresis, fever, weakness, weight gain and weight loss.   HENT: Negative for sore throat.    Eyes: Negative for blurred vision, vision loss in left eye, vision loss in right eye and visual disturbance.   Cardiovascular: Positive for dyspnea on exertion, irregular heartbeat and palpitations. Negative for chest pain, claudication, leg swelling, near-syncope, orthopnea, paroxysmal nocturnal dyspnea and syncope.   Respiratory: Negative for cough, hemoptysis, shortness of breath, sputum production and wheezing.    Endocrine: Negative for cold intolerance and heat intolerance.   Hematologic/Lymphatic: Negative for adenopathy. Does not bruise/bleed easily.   Skin: Negative for rash.   Musculoskeletal: Negative for falls, muscle weakness and myalgias.   Gastrointestinal: Negative for abdominal pain, change in bowel habit, constipation, diarrhea, melena and nausea.   Genitourinary: Negative for bladder incontinence.   Neurological: Negative for dizziness, focal weakness, headaches, light-headedness and numbness.   Psychiatric/Behavioral: Negative for altered mental status.         Allergies and current medications updated and reviewed:  Review of patient's allergies indicates:   Allergen Reactions    Sulfa (sulfonamide antibiotics)      Current Outpatient Prescriptions   Medication Sig Dispense Refill    butalbital-acetaminophen-caffeine -40 mg (FIORICET, ESGIC) -40 mg per tablet TAKE 1 TABLET BY MOUTH EVERY 6 HOURS AS NEEDED 120 tablet 5    CONTOUR TEST STRIPS Strp TEST BLOOD SUGARS 4 TIMES DAILY 150 strip 11    cyclobenzaprine (FLEXERIL) 10 MG tablet  "TAKE 1 TABLET(10 MG) BY MOUTH EVERY 8 HOURS 90 tablet 5    fenofibric acid (FIBRICOR) 135 mg CpDR Take 1 capsule (135 mg total) by mouth once daily. 90 capsule 3    fluticasone (FLONASE) 50 mcg/actuation nasal spray 2 sprays by Each Nare route 2 (two) times daily. 16 g 11    metformin (GLUCOPHAGE-XR) 750 MG 24 hr tablet Take 1 tablet (750 mg total) by mouth every evening. 90 tablet 3    metoprolol succinate (TOPROL-XL) 200 MG 24 hr tablet TAKE 1 TABLET(200 MG) BY MOUTH TWICE DAILY 180 tablet 4    pravastatin (PRAVACHOL) 20 MG tablet TAKE 1 TABLET(20 MG) BY MOUTH EVERY EVENING 31 tablet 0    rabeprazole (ACIPHEX) 20 mg tablet TAKE 1 TABLET BY MOUTH DAILY 90 tablet 3    ramipril (ALTACE) 10 MG capsule TAKE 1 CAPSULE(10 MG) BY MOUTH EVERY DAY 90 capsule 0    SITagliptan-metformin (JANUMET)  mg per tablet Take 1 tablet by mouth 2 (two) times daily. 180 tablet 3    verapamil (CALAN-SR) 240 MG CR tablet TAKE 1 TABLET(240 MG) BY MOUTH TWICE DAILY 180 tablet 4     No current facility-administered medications for this visit.        Objective:      BP (!) 153/95 (BP Location: Right arm, Patient Position: Sitting, BP Method: Large (Automatic))   Pulse 79   Ht 5' 5.5" (1.664 m)   Wt 73.4 kg (161 lb 13.1 oz)   SpO2 95%   BMI 26.52 kg/m²     Body surface area is 1.84 meters squared.  Physical Exam   Constitutional: She is oriented to person, place, and time. She appears well-developed and well-nourished. No distress.   HENT:   Head: Normocephalic and atraumatic.   Mouth/Throat: Oropharynx is clear and moist.   Eyes: Conjunctivae and EOM are normal. Pupils are equal, round, and reactive to light. No scleral icterus.   Neck: Neck supple. No JVD present. No tracheal deviation present.   Cardiovascular: Normal rate and regular rhythm.  Exam reveals no gallop and no friction rub.    Murmur heard.  Pulmonary/Chest: Effort normal and breath sounds normal. No respiratory distress. She has no wheezes. She has no rales. " She exhibits no tenderness.   Abdominal: Soft. Bowel sounds are normal. She exhibits no distension. There is no hepatosplenomegaly. There is no tenderness.   Musculoskeletal: She exhibits no edema or tenderness.   Neurological: She is alert and oriented to person, place, and time.   Skin: Skin is warm and dry. No rash noted. No erythema.   Psychiatric: She has a normal mood and affect. Her behavior is normal.       Chemistry        Component Value Date/Time     02/08/2018 1325     02/08/2018 1325    K 3.7 02/08/2018 1325    K 3.7 02/08/2018 1325     02/08/2018 1325     02/08/2018 1325    CO2 27 02/08/2018 1325    CO2 27 02/08/2018 1325    BUN 7 (L) 02/08/2018 1325    BUN 7 (L) 02/08/2018 1325    CREATININE 0.8 02/08/2018 1325    CREATININE 0.8 02/08/2018 1325     (H) 02/08/2018 1325     (H) 02/08/2018 1325        Component Value Date/Time    CALCIUM 9.3 02/08/2018 1325    CALCIUM 9.3 02/08/2018 1325    ALKPHOS 49 (L) 02/08/2018 1325    ALKPHOS 49 (L) 02/08/2018 1325    AST 18 02/08/2018 1325    AST 18 02/08/2018 1325    ALT 15 02/08/2018 1325    ALT 15 02/08/2018 1325    BILITOT 0.4 02/08/2018 1325    BILITOT 0.4 02/08/2018 1325    ESTGFRAFRICA >60.0 02/08/2018 1325    ESTGFRAFRICA >60.0 02/08/2018 1325    EGFRNONAA >60.0 02/08/2018 1325    EGFRNONAA >60.0 02/08/2018 1325          Recent Labs  Lab 02/08/18  1325   WHITE BLOOD CELL COUNT 9.81   HEMOGLOBIN 13.4   HEMATOCRIT 40.8   MCV 89   PLATELETS 239    H   TSH 0.450   CHOLESTEROL 210 H   HDL 32 L   LDL CHOLESTEROL Invalid, Trig>400.0   TRIGLYCERIDES 542 H   HDL/CHOLESTEROL RATIO 15.2 L              Test(s) Reviewed  I have reviewed the following in detail:  [] Stress test   [] Angiography   [x] Echocardiogram   [x] Labs- TG are elevated   [] Other:         Assessment/Plan:   Hypertrophic cardiomyopathy  Hx of HOCM s/p ETOH ablation by Dr. Flaherty on March 13, 2007. There is akinesis of the ventricular septum. The LV  function appears < 50 which is significant abnormal in patient's with HCM. Currently on echo she does not have any obstruction. She has poor insight into her CM. She was instructed to continue all her medications.    CAD (coronary artery disease)  s/p LAD and RCA PCI in 2006.  On ASA, statin, and b-blocker.     Automatic implantable cardioverter-defibrillator in situ  Placed in Aug 2010 for NSVT and hx of HCM.  Recent interrogation showed 18 sec episode of NSVT, asymptomatic.   No recent shocks.   Follows with Dr. Sneed.       Ventricular tachycardia  Long hx of NSVT (since 2009) secondary   On metoprolol and verapamil.  Has never been on antiarrhythmic.       Hypertension  Controlled on current regimen.     Hyperlipidemia  Uncontrolled.  On statin and fenofibrate.  Will defer management to Dr. Sneed.    Diabetes mellitus, type 2  Stable.   On PO meds.   HgbA1C 6.2    Patient's HOCM successfully treated in 2007 -Hx of NSVT dating back to 2009. Will discuss with Dr. Medina patient is at risk of VT given dx of HCM and alcohol septal ablation in 2007

## 2018-02-08 NOTE — ASSESSMENT & PLAN NOTE
Long hx of NSVT (since 2009) secondary   On metoprolol and verapamil.  Has never been on antiarrhythmic.

## 2018-02-08 NOTE — ASSESSMENT & PLAN NOTE
Placed in Aug 2010 for NSVT and hx of HCM.  Recent interrogation showed 18 sec episode of NSVT, asymptomatic.   No recent shocks.   Follows with Dr. Sneed.

## 2018-02-14 ENCOUNTER — TELEPHONE (OUTPATIENT)
Dept: CARDIOLOGY | Facility: CLINIC | Age: 68
End: 2018-02-14

## 2018-02-14 NOTE — TELEPHONE ENCOUNTER
----- Message from Kassandra Doyle sent at 2/14/2018  2:58 PM CST -----  Contact: Patient  Inge, patient [phone], calling about the visit with the cardiology department today at [facility]. She has questions, regarding findings. Please advise. Thanks.

## 2018-02-19 ENCOUNTER — TELEPHONE (OUTPATIENT)
Dept: CARDIOLOGY | Facility: CLINIC | Age: 68
End: 2018-02-19

## 2018-02-19 NOTE — TELEPHONE ENCOUNTER
----- Message from Laura Sheridan sent at 2/16/2018  4:33 PM CST -----  Contact: Pt  Pt is calling to get appointment 02/27 to mon,wed, or fri of same week. Pt would like to be called at [phone]

## 2018-02-19 NOTE — TELEPHONE ENCOUNTER
Spoke with the patient she was advised we don't have any other appointment until 3/15.she stated that she would keep her appointment in 2/27

## 2018-02-26 ENCOUNTER — TELEPHONE (OUTPATIENT)
Dept: CARDIOLOGY | Facility: CLINIC | Age: 68
End: 2018-02-26

## 2018-02-26 NOTE — TELEPHONE ENCOUNTER
----- Message from Leighton Calderón sent at 2/26/2018  3:30 PM CST -----  Contact: Patient  Inge, [phone], calling in regards to a phone call received today. Stated she received a call about canceling or getting more information, and wasn't sure why she was getting that call since she confirmed already. Also would like to let staff know that her sister would like to listen on the phone for the appointment. Would like a call back before end of business. Please advise. Thanks.

## 2018-02-27 ENCOUNTER — OFFICE VISIT (OUTPATIENT)
Dept: CARDIOLOGY | Facility: CLINIC | Age: 68
End: 2018-02-27
Payer: MEDICARE

## 2018-02-27 VITALS
DIASTOLIC BLOOD PRESSURE: 85 MMHG | SYSTOLIC BLOOD PRESSURE: 133 MMHG | HEIGHT: 66 IN | BODY MASS INDEX: 25.9 KG/M2 | HEART RATE: 71 BPM | WEIGHT: 161.19 LBS

## 2018-02-27 DIAGNOSIS — I10 ESSENTIAL HYPERTENSION: ICD-10-CM

## 2018-02-27 DIAGNOSIS — Z95.810 AUTOMATIC IMPLANTABLE CARDIOVERTER-DEFIBRILLATOR IN SITU: ICD-10-CM

## 2018-02-27 DIAGNOSIS — I25.10 CORONARY ARTERY DISEASE INVOLVING NATIVE CORONARY ARTERY OF NATIVE HEART WITHOUT ANGINA PECTORIS: Primary | ICD-10-CM

## 2018-02-27 DIAGNOSIS — I42.2 HYPERTROPHIC CARDIOMYOPATHY: ICD-10-CM

## 2018-02-27 DIAGNOSIS — E78.5 HYPERLIPIDEMIA, UNSPECIFIED HYPERLIPIDEMIA TYPE: ICD-10-CM

## 2018-02-27 PROCEDURE — 99214 OFFICE O/P EST MOD 30 MIN: CPT | Mod: S$PBB,,, | Performed by: INTERNAL MEDICINE

## 2018-02-27 PROCEDURE — 1126F AMNT PAIN NOTED NONE PRSNT: CPT | Mod: ,,, | Performed by: INTERNAL MEDICINE

## 2018-02-27 PROCEDURE — 99999 PR PBB SHADOW E&M-EST. PATIENT-LVL III: CPT | Mod: PBBFAC,,, | Performed by: INTERNAL MEDICINE

## 2018-02-27 PROCEDURE — 99213 OFFICE O/P EST LOW 20 MIN: CPT | Mod: PBBFAC,PO | Performed by: INTERNAL MEDICINE

## 2018-02-27 PROCEDURE — 1159F MED LIST DOCD IN RCRD: CPT | Mod: ,,, | Performed by: INTERNAL MEDICINE

## 2018-02-27 NOTE — PROGRESS NOTES
"Subjective:    Patient ID:  Inge Monge is a 67 y.o. female who presents for follow-up of cad, hocm, nsvt    HPI  She comes with no complaints, no chest pain, no shortness of breath  Was seen by congenital heart clinic where she was told she had a big "heart attack"  Demanding angiogram    Review of Systems   Constitution: Negative for decreased appetite, weakness, malaise/fatigue, weight gain and weight loss.   Cardiovascular: Negative for chest pain, dyspnea on exertion, leg swelling, palpitations and syncope.   Respiratory: Negative for cough and shortness of breath.    Gastrointestinal: Negative.    All other systems reviewed and are negative.       Objective:    Physical Exam   Constitutional: She is oriented to person, place, and time. She appears well-developed and well-nourished.   HENT:   Head: Normocephalic.   Eyes: Pupils are equal, round, and reactive to light.   Neck: Normal range of motion. Neck supple. No JVD present. Carotid bruit is not present. No thyromegaly present.   Cardiovascular: Normal rate, regular rhythm, normal heart sounds, intact distal pulses and normal pulses.  PMI is not displaced.  Exam reveals no gallop.    No murmur heard.  Pulmonary/Chest: Effort normal and breath sounds normal.   Abdominal: Soft. Normal appearance. She exhibits no mass. There is no hepatosplenomegaly. There is no tenderness.   Musculoskeletal: Normal range of motion. She exhibits no edema.   Neurological: She is alert and oriented to person, place, and time. She has normal strength and normal reflexes. No sensory deficit.   Skin: Skin is warm and intact.   Psychiatric: She has a normal mood and affect.   Nursing note and vitals reviewed.        Assessment:       1. Coronary artery disease involving native coronary artery of native heart without angina pectoris    2. Essential hypertension    3. Hyperlipidemia, unspecified hyperlipidemia type    4. Hypertrophic cardiomyopathy    5. Automatic implantable " cardioverter-defibrillator in situ         Plan:     [facility]

## 2018-03-01 ENCOUNTER — OFFICE VISIT (OUTPATIENT)
Dept: FAMILY MEDICINE | Facility: CLINIC | Age: 68
End: 2018-03-01
Payer: MEDICARE

## 2018-03-01 VITALS
OXYGEN SATURATION: 97 % | WEIGHT: 160.94 LBS | BODY MASS INDEX: 30.39 KG/M2 | HEART RATE: 81 BPM | SYSTOLIC BLOOD PRESSURE: 118 MMHG | DIASTOLIC BLOOD PRESSURE: 82 MMHG | HEIGHT: 61 IN | RESPIRATION RATE: 20 BRPM

## 2018-03-01 DIAGNOSIS — Z12.39 BREAST CANCER SCREENING: ICD-10-CM

## 2018-03-01 DIAGNOSIS — E11.9 COMPREHENSIVE DIABETIC FOOT EXAMINATION, TYPE 2 DM, ENCOUNTER FOR: ICD-10-CM

## 2018-03-01 DIAGNOSIS — R53.83 FATIGUE, UNSPECIFIED TYPE: Primary | ICD-10-CM

## 2018-03-01 DIAGNOSIS — E11.9 TYPE 2 DIABETES MELLITUS WITHOUT COMPLICATION, WITHOUT LONG-TERM CURRENT USE OF INSULIN: ICD-10-CM

## 2018-03-01 PROCEDURE — 90662 IIV NO PRSV INCREASED AG IM: CPT | Mod: PBBFAC,PO

## 2018-03-01 PROCEDURE — 99215 OFFICE O/P EST HI 40 MIN: CPT | Mod: PBBFAC,PO,25 | Performed by: FAMILY MEDICINE

## 2018-03-01 PROCEDURE — 99999 PR PBB SHADOW E&M-EST. PATIENT-LVL V: CPT | Mod: PBBFAC,,, | Performed by: FAMILY MEDICINE

## 2018-03-01 PROCEDURE — 99213 OFFICE O/P EST LOW 20 MIN: CPT | Mod: S$PBB,,, | Performed by: FAMILY MEDICINE

## 2018-03-01 NOTE — PROGRESS NOTES
Subjective:       Patient ID: Inge Monge is a 67 y.o. female.    Chief Complaint: Fatigue    C/o some general fatigue and sleepiness for the last 3 weeks.  Sleep is interrupted.      Following with cardiology regarding akinesis seen on echo.  [facility] and f/u with Dr. Medina is pending.        Past Medical History:   Diagnosis Date    CAD (coronary artery disease)     Cataract     Colon polyps     COPD (chronic obstructive pulmonary disease)     Diabetes mellitus type II     Hyperlipidemia     Hypertension     Hypertrophic cardiomyopathy     ICD (implantable cardiac defibrillator) in place     Osteopenia     Peptic ulcer disease     Valvular heart disease        Past Surgical History:   Procedure Laterality Date    Angiogram with stents      CARDIAC DEFIBRILLATOR PLACEMENT  3 yrs    HYSTERECTOMY      left arm surgery         Review of patient's allergies indicates:   Allergen Reactions    Sulfa (sulfonamide antibiotics)        Social History     Social History    Marital status:      Spouse name: N/A    Number of children: N/A    Years of education: N/A     Occupational History    Not on file.     Social History Main Topics    Smoking status: Former Smoker     Types: Cigarettes     Quit date: 11/21/2006    Smokeless tobacco: Never Used    Alcohol use No    Drug use: No    Sexual activity: Not on file     Other Topics Concern    Not on file     Social History Narrative    No narrative on file       Current Outpatient Prescriptions on File Prior to Visit   Medication Sig Dispense Refill    butalbital-acetaminophen-caffeine -40 mg (FIORICET, ESGIC) -40 mg per tablet TAKE 1 TABLET BY MOUTH EVERY 6 HOURS AS NEEDED 120 tablet 5    CONTOUR TEST STRIPS Strp TEST BLOOD SUGARS 4 TIMES DAILY 150 strip 11    cyclobenzaprine (FLEXERIL) 10 MG tablet TAKE 1 TABLET(10 MG) BY MOUTH EVERY 8 HOURS 90 tablet 5    fenofibric acid (FIBRICOR) 135 mg CpDR Take 1 capsule (135 mg total) by  mouth once daily. 90 capsule 3    fluticasone (FLONASE) 50 mcg/actuation nasal spray 2 sprays by Each Nare route 2 (two) times daily. 16 g 11    metformin (GLUCOPHAGE-XR) 750 MG 24 hr tablet Take 1 tablet (750 mg total) by mouth every evening. 90 tablet 3    metoprolol succinate (TOPROL-XL) 200 MG 24 hr tablet TAKE 1 TABLET(200 MG) BY MOUTH TWICE DAILY 180 tablet 4    pravastatin (PRAVACHOL) 20 MG tablet TAKE 1 TABLET(20 MG) BY MOUTH EVERY EVENING 31 tablet 0    rabeprazole (ACIPHEX) 20 mg tablet TAKE 1 TABLET BY MOUTH DAILY 90 tablet 3    ramipril (ALTACE) 10 MG capsule TAKE 1 CAPSULE(10 MG) BY MOUTH EVERY DAY 90 capsule 0    SITagliptan-metformin (JANUMET)  mg per tablet Take 1 tablet by mouth 2 (two) times daily. 180 tablet 3    verapamil (CALAN-SR) 240 MG CR tablet TAKE 1 TABLET(240 MG) BY MOUTH TWICE DAILY 180 tablet 4     No current facility-administered medications on file prior to visit.        Family History   Problem Relation Age of Onset    Heart disease Father     Cancer Father     Cataracts Mother     Cancer Sister     Diabetes Sister     Diabetes Paternal Grandmother     Amblyopia Neg Hx     Blindness Neg Hx     Glaucoma Neg Hx     Hypertension Neg Hx     Macular degeneration Neg Hx     Retinal detachment Neg Hx     Strabismus Neg Hx     Stroke Neg Hx     Thyroid disease Neg Hx        Review of Systems   Constitutional: Positive for fatigue. Negative for appetite change, chills, fever and unexpected weight change.   HENT: Negative for sore throat and trouble swallowing.    Eyes: Negative for pain and visual disturbance.   Respiratory: Negative for cough, shortness of breath and wheezing.    Cardiovascular: Negative for chest pain and palpitations.   Gastrointestinal: Negative for abdominal pain, blood in stool, diarrhea, nausea and vomiting.   Genitourinary: Negative for difficulty urinating, dysuria and hematuria.   Musculoskeletal: Negative for arthralgias, gait problem  "and neck pain.   Skin: Negative for rash and wound.   Neurological: Negative for dizziness, weakness, numbness and headaches.   Hematological: Negative for adenopathy.   Psychiatric/Behavioral: Negative for dysphoric mood.       Objective:      /82   Pulse 81   Resp 20   Ht 5' 0.5" (1.537 m)   Wt 73 kg (160 lb 15 oz)   SpO2 97%   BMI 30.91 kg/m²   Physical Exam   Constitutional: She is oriented to person, place, and time. She appears well-developed and well-nourished.   HENT:   Head: Normocephalic.   Mouth/Throat: Oropharynx is clear and moist. No oropharyngeal exudate or posterior oropharyngeal erythema.   Eyes: Conjunctivae and EOM are normal. Pupils are equal, round, and reactive to light.   Neck: Normal range of motion. Neck supple. No thyromegaly present.   Cardiovascular: Normal rate, regular rhythm, S1 normal, S2 normal, normal heart sounds and intact distal pulses.  Exam reveals no gallop and no friction rub.    No murmur heard.  Pulmonary/Chest: Effort normal and breath sounds normal. She has no wheezes. She has no rales.   Abdominal: Normal appearance.   Musculoskeletal:        Right lower leg: She exhibits no edema.        Left lower leg: She exhibits no edema.   Lymphadenopathy:     She has no cervical adenopathy.   Neurological: She is alert and oriented to person, place, and time. No cranial nerve deficit. Gait normal.   Skin: Skin is warm and intact. No rash noted.   Psychiatric: She has a normal mood and affect.     Foot exam: inspection normal, sensation intact; 2+ DP pulses bilaterally    Results for orders placed or performed during the hospital encounter of 02/08/18   2D echo with color flow doppler   Result Value Ref Range    EF 50 55 - 65    Mitral Valve Regurgitation MILD     Aortic Valve Regurgitation TRIVIAL TO MILD     Est. PA Systolic Pressure 31.14     Tricuspid Valve Regurgitation TRIVIAL TO MILD      Recent labs reviewed    Assessment:       1. Fatigue, unspecified type  "   2. Type 2 diabetes mellitus without complication, without long-term current use of insulin    3. Breast cancer screening    4. Comprehensive diabetic foot examination, type 2 DM, encounter for        Plan:       Fatigue, unspecified type    Type 2 diabetes mellitus without complication, without long-term current use of insulin  -     Ambulatory referral to Optometry    Breast cancer screening  -     Mammo Digital Screening Bilateral With CAD; Future; Expected date: 03/01/2018    Comprehensive diabetic foot examination, type 2 DM, encounter for        Trial of melatonin 3mg QHS for sleep  F/u with pulmonology as planned  Continue present meds and f/u with specialist as planned

## 2018-03-06 RX ORDER — VERAPAMIL HYDROCHLORIDE 240 MG/1
TABLET, FILM COATED, EXTENDED RELEASE ORAL
Qty: 180 TABLET | Refills: 0 | Status: SHIPPED | OUTPATIENT
Start: 2018-03-06 | End: 2018-10-26

## 2018-03-12 ENCOUNTER — HOSPITAL ENCOUNTER (OUTPATIENT)
Dept: RADIOLOGY | Facility: HOSPITAL | Age: 68
Discharge: HOME | End: 2018-03-12
Attending: FAMILY MEDICINE
Payer: MEDICARE

## 2018-03-12 DIAGNOSIS — Z12.39 BREAST CANCER SCREENING: ICD-10-CM

## 2018-03-12 PROCEDURE — 77067 SCR MAMMO BI INCL CAD: CPT | Mod: 26,,, | Performed by: RADIOLOGY

## 2018-03-12 PROCEDURE — 77067 SCR MAMMO BI INCL CAD: CPT | Mod: TC,PO

## 2018-03-12 PROCEDURE — 77063 BREAST TOMOSYNTHESIS BI: CPT | Mod: 26,,, | Performed by: RADIOLOGY

## 2018-03-14 DIAGNOSIS — J44.9 CHRONIC OBSTRUCTIVE PULMONARY DISEASE, UNSPECIFIED COPD TYPE: Primary | ICD-10-CM

## 2018-03-19 RX ORDER — RAMIPRIL 10 MG/1
CAPSULE ORAL
Qty: 90 CAPSULE | Refills: 0 | Status: SHIPPED | OUTPATIENT
Start: 2018-03-19 | End: 2018-10-26

## 2018-03-21 ENCOUNTER — OFFICE VISIT (OUTPATIENT)
Dept: OPTOMETRY | Facility: CLINIC | Age: 68
End: 2018-03-21
Payer: MEDICARE

## 2018-03-21 DIAGNOSIS — H52.7 REFRACTIVE ERROR: ICD-10-CM

## 2018-03-21 DIAGNOSIS — E11.9 TYPE 2 DIABETES MELLITUS WITHOUT RETINOPATHY: Primary | ICD-10-CM

## 2018-03-21 DIAGNOSIS — H25.13 NUCLEAR SCLEROSIS OF BOTH EYES: ICD-10-CM

## 2018-03-21 PROCEDURE — 92015 DETERMINE REFRACTIVE STATE: CPT | Mod: ,,, | Performed by: OPTOMETRIST

## 2018-03-21 PROCEDURE — 99212 OFFICE O/P EST SF 10 MIN: CPT | Mod: PBBFAC,PO | Performed by: OPTOMETRIST

## 2018-03-21 PROCEDURE — 99999 PR PBB SHADOW E&M-EST. PATIENT-LVL II: CPT | Mod: PBBFAC,,, | Performed by: OPTOMETRIST

## 2018-03-21 PROCEDURE — 92014 COMPRE OPH EXAM EST PT 1/>: CPT | Mod: S$PBB,,, | Performed by: OPTOMETRIST

## 2018-03-21 NOTE — PROGRESS NOTES
HPI     Routine diabetic eye exam--dle--2016  Diabetic eye exam  Pt denies any changes/blurred vision since last visit. Only wears glasses   for reading small print. No eye pain.    Hemoglobin A1C       Date                     Value               Ref Range             Status                02/08/2018               6.2 (H)             4.0 - 5.6 %           Final              Comment:    According to ADA guidelines, hemoglobin A1c <7.0% represents  optimal   control in non-pregnant diabetic patients. Different  metrics may apply to   specific patient populations.   Standards of Medical Care in   Diabetes-2016.  For the purpose of screening for the presence of   diabetes:  <5.7%     Consistent with the absence of diabetes  5.7-6.4%    Consistent with increasing risk for diabetes   (prediabetes)  >or=6.5%    Consistent with diabetes  Currently, no consensus exists for use of   hemoglobin A1c  for diagnosis of diabetes for children.  This Hemoglobin   A1c assay has significant interference with fetal   hemoglobin   (HbF).   The results are invalid for patients with abnormal amounts of   HbF,     including those with known Hereditary Persistence   of Fetal Hemoglobin.   Heterozygous hemoglobin variants (HbAS, HbAC,   HbAD, HbAE, HbA2) do not   significantly interfere with this assay;   however, presence of multiple   variants in a sample may impact the %   interference.         02/27/2017               5.7                 4.5 - 6.2 %           Final              Comment:    According to ADA guidelines, hemoglobin A1C <7.0% represents  optimal   control in non-pregnant diabetic patients.  Different  metrics may apply   to specific populations.   Standards of Medical Care in Diabetes -   2016.  For the purpose of screening for the presence of diabetes:  <5.7%       Consistent with the absence of diabetes  5.7-6.4%  Consistent with   increasing risk for diabetes   (prediabetes)  >or=6.5%  Consistent with    diabetes  Currently no consensus exists for use of hemoglobin A1C  for   diagnosis of diabetes for children.         08/01/2016               6.3 (H)             4.5 - 6.2 %           Final              Comment:    According to ADA guidelines, hemoglobin A1C <7.0% represents  optimal   control in non-pregnant diabetic patients.  Different  metrics may apply   to specific populations.   Standards of Medical Care in Diabetes -   2016.  For the purpose of screening for the presence of diabetes:  <5.7%       Consistent with the absence of diabetes  5.7-6.4%  Consistent with   increasing risk for diabetes   (prediabetes)  >or=6.5%  Consistent with   diabetes  Currently no consensus exists for use of hemoglobin A1C  for   diagnosis of diabetes for children.    ----------        Last edited by Nelson Cameron, OD on 3/21/2018  1:49 PM. (History)            Assessment /Plan     For exam results, see Encounter Report.    Type 2 diabetes mellitus without retinopathy - Both Eyes    Nuclear sclerosis of both eyes    Refractive error      1. No diabetic retinopathy, no csme. Return in 1 year for dilated eye exam.  2. Educated pt on presence of cataracts and effects on vision. No surgery at this time. Recheck in one year.  3. Spec Rx given. Different lens options discussed with patient. RTC 1 year full exam.

## 2018-03-21 NOTE — LETTER
March 21, 2018      Roverto Davis MD  [address] [facility] [address] [place] [address]           [facility]  [address] [facility] [address] [place] [address]  Phone: [phone]  Fax: [phone]          Patient: Inge Monge   MR Number: [id number]   YOB: 1950   Date of Visit: 3/21/2018       Dear Dr. Roverto Davis:    Thank you for referring Inge Monge to me for evaluation. Attached you will find relevant portions of my assessment and plan of care.    If you have questions, please do not hesitate to call me. I look forward to following Inge Monge along with you.    Sincerely,    Nelson Cameron, OD    Enclosure  CC:  No Recipients    If you would like to receive this communication electronically, please contact [email or web address] or [phone] to request more information on [employer] Link access.    For providers and/or their staff who would like to refer a patient to [facility], please contact us through our one-stop-shop provider referral line, [facility], at [phone].    If you feel you have received this communication in error or would no longer like to receive these types of communications, please e-mail [email or web address]

## 2018-03-26 ENCOUNTER — HOSPITAL ENCOUNTER (OUTPATIENT)
Dept: PULMONOLOGY | Facility: CLINIC | Age: 68
Discharge: HOME | End: 2018-03-26
Payer: MEDICARE

## 2018-03-26 ENCOUNTER — OFFICE VISIT (OUTPATIENT)
Dept: PULMONOLOGY | Facility: CLINIC | Age: 68
End: 2018-03-26
Payer: MEDICARE

## 2018-03-26 VITALS
BODY MASS INDEX: 27.66 KG/M2 | DIASTOLIC BLOOD PRESSURE: 80 MMHG | OXYGEN SATURATION: 91 % | WEIGHT: 166 LBS | SYSTOLIC BLOOD PRESSURE: 144 MMHG | HEART RATE: 72 BPM | HEIGHT: 65 IN

## 2018-03-26 DIAGNOSIS — J44.9 CHRONIC OBSTRUCTIVE PULMONARY DISEASE, UNSPECIFIED COPD TYPE: ICD-10-CM

## 2018-03-26 DIAGNOSIS — J43.2 CENTRILOBULAR EMPHYSEMA: Primary | ICD-10-CM

## 2018-03-26 LAB
PRE FEV1 FVC: 44
PRE FEV1: 1
PRE FVC: 2.29
PREDICTED FEV1 FVC: 78
PREDICTED FEV1: 2.34
PREDICTED FVC: 2.99

## 2018-03-26 PROCEDURE — 99213 OFFICE O/P EST LOW 20 MIN: CPT | Mod: PBBFAC,25 | Performed by: INTERNAL MEDICINE

## 2018-03-26 PROCEDURE — 94010 BREATHING CAPACITY TEST: CPT | Mod: PBBFAC | Performed by: INTERNAL MEDICINE

## 2018-03-26 PROCEDURE — 99214 OFFICE O/P EST MOD 30 MIN: CPT | Mod: 25,S$PBB,, | Performed by: INTERNAL MEDICINE

## 2018-03-26 PROCEDURE — 99999 PR PBB SHADOW E&M-EST. PATIENT-LVL III: CPT | Mod: PBBFAC,,, | Performed by: INTERNAL MEDICINE

## 2018-03-26 PROCEDURE — 94010 BREATHING CAPACITY TEST: CPT | Mod: 26,S$PBB,, | Performed by: INTERNAL MEDICINE

## 2018-03-26 RX ORDER — BUDESONIDE AND FORMOTEROL FUMARATE DIHYDRATE 160; 4.5 UG/1; UG/1
2 AEROSOL RESPIRATORY (INHALATION) 2 TIMES DAILY
Qty: 1 INHALER | Refills: 12 | Status: SHIPPED | OUTPATIENT
Start: 2018-03-26 | End: 2019-03-19 | Stop reason: ALTCHOICE

## 2018-03-28 NOTE — PROGRESS NOTES
Subjective:       Patient ID: Inge Monge is a 68 y.o. female.    Chief Complaint: COPD    HPI 67 yo female from the [facility] comes for follow up on her COPD. She feels that it is not a problem and she has not been taking any pulmonary medicati PFT;s ons. Last visit with me was July 2016 and she said that she has had no COPD exacerbations. PFT's have fallen, FVC: 2.29 liters and Fev-1: 1.00 liters.   At last visit values were: 2.63 liters and 1.40 liters.Her Peak flow responded to a albuterol nebulizer treatment. Mercedes form 150 l/min to 250 l/min Encouraged to definitely use her maintenance bronchodilators regularly.. Symbicort.   Poor to no compliance with medications.        No flowsheet data found.]  Review of Systems   Constitutional: Negative.    HENT: Negative.    Eyes: Negative.    Respiratory: Negative.         COPD with poor compliance   Cardiovascular: Negative.         Hypertrophic Cardiomyopathy    AIDCdevice in place   Genitourinary: Negative.    Endocrine: Type II diabetes   Musculoskeletal: Negative.    Skin: Negative.    Gastrointestinal: Negative.    Neurological: Negative.    Psychiatric/Behavioral: Negative.        Objective:      Physical Exam   Constitutional: She is oriented to person, place, and time. She appears well-developed and well-nourished. No distress.   HENT:   Head: Normocephalic and atraumatic.   Right Ear: External ear normal.   Left Ear: External ear normal.   Nose: Nose normal.   Mouth/Throat: Oropharynx is clear and moist.   Eyes: Conjunctivae and EOM are normal. Pupils are equal, round, and reactive to light.   Neck: Normal range of motion. Neck supple. No JVD present. No thyromegaly present.   Cardiovascular: Normal rate, regular rhythm, normal heart sounds and intact distal pulses.  Exam reveals no gallop.    No murmur heard.  Pulmonary/Chest: Breath sounds normal. No stridor. No respiratory distress. She has no wheezes. She has no rales. She exhibits no tenderness.    Decreased breath sounds without wheezes or rales   Abdominal: Soft. Bowel sounds are normal. She exhibits no distension and no mass. There is no tenderness. There is no rebound and no guarding.   Musculoskeletal: Normal range of motion. She exhibits no edema.   Lymphadenopathy:     She has no cervical adenopathy.   Neurological: She is alert and oriented to person, place, and time. She has normal reflexes. She displays normal reflexes. No cranial nerve deficit.   Skin: Skin is warm and dry. No rash noted.   Psychiatric: She has a normal mood and affect. Her behavior is normal. Judgment and thought content normal.   Nursing note and vitals reviewed.          Assessment:       1. Centrilobular emphysema        Outpatient Encounter Prescriptions as of 3/26/2018   Medication Sig Dispense Refill    budesonide-formoterol 160-4.5 mcg (SYMBICORT) 160-4.5 mcg/actuation HFAA Inhale 2 puffs into the lungs 2 (two) times daily. 1 Inhaler 12    butalbital-acetaminophen-caffeine -40 mg (FIORICET, ESGIC) -40 mg per tablet TAKE 1 TABLET BY MOUTH EVERY 6 HOURS AS NEEDED 120 tablet 5    CONTOUR TEST STRIPS Strp TEST BLOOD SUGARS 4 TIMES DAILY 150 strip 11    cyclobenzaprine (FLEXERIL) 10 MG tablet TAKE 1 TABLET(10 MG) BY MOUTH EVERY 8 HOURS 90 tablet 5    fenofibric acid (FIBRICOR) 135 mg CpDR Take 1 capsule (135 mg total) by mouth once daily. 90 capsule 3    fluticasone (FLONASE) 50 mcg/actuation nasal spray 2 sprays by Each Nare route 2 (two) times daily. 16 g 11    metformin (GLUCOPHAGE-XR) 750 MG 24 hr tablet Take 1 tablet (750 mg total) by mouth every evening. 90 tablet 3    metoprolol succinate (TOPROL-XL) 200 MG 24 hr tablet TAKE 1 TABLET(200 MG) BY MOUTH TWICE DAILY 180 tablet 4    pravastatin (PRAVACHOL) 20 MG tablet TAKE 1 TABLET(20 MG) BY MOUTH EVERY EVENING 31 tablet 0    rabeprazole (ACIPHEX) 20 mg tablet TAKE 1 TABLET BY MOUTH DAILY 90 tablet 3    ramipril (ALTACE) 10 MG capsule TAKE 1 CAPSULE(10  MG) BY MOUTH EVERY DAY 90 capsule 0    ramipril (ALTACE) 10 MG capsule TAKE ONE CAPSULE BY MOUTH ONCE DAILY 90 capsule 0    SITagliptan-metformin (JANUMET)  mg per tablet Take 1 tablet by mouth 2 (two) times daily. 180 tablet 3    verapamil (CALAN-SR) 240 MG CR tablet TAKE 1 TABLET(240 MG) BY MOUTH TWICE DAILY 180 tablet 4    verapamil (CALAN-SR) 240 MG CR tablet TAKE 1 TABLET(240 MG) BY MOUTH TWICE DAILY 180 tablet 0     No facility-administered encounter medications on file as of 3/26/2018.      No orders of the defined types were placed in this encounter.    Plan:            Poor compliance with medications Hopefully she will use her Symbicort bid regularly. IMP Moderate COPD

## 2018-03-29 DIAGNOSIS — J44.9 CHRONIC OBSTRUCTIVE PULMONARY DISEASE, UNSPECIFIED COPD TYPE: Primary | ICD-10-CM

## 2018-04-02 DIAGNOSIS — Z95.810 AUTOMATIC IMPLANTABLE CARDIOVERTER-DEFIBRILLATOR IN SITU: Primary | ICD-10-CM

## 2018-04-02 DIAGNOSIS — I47.20 VENTRICULAR TACHYCARDIA: ICD-10-CM

## 2018-04-09 ENCOUNTER — INITIAL CONSULT (OUTPATIENT)
Dept: CARDIOLOGY | Facility: CLINIC | Age: 68
End: 2018-04-09
Payer: MEDICARE

## 2018-04-09 ENCOUNTER — CLINICAL SUPPORT (OUTPATIENT)
Dept: CARDIOLOGY | Facility: CLINIC | Age: 68
End: 2018-04-09
Attending: INTERNAL MEDICINE
Payer: MEDICARE

## 2018-04-09 VITALS
DIASTOLIC BLOOD PRESSURE: 88 MMHG | BODY MASS INDEX: 26.81 KG/M2 | HEART RATE: 69 BPM | WEIGHT: 160.94 LBS | HEIGHT: 65 IN | SYSTOLIC BLOOD PRESSURE: 137 MMHG

## 2018-04-09 DIAGNOSIS — I42.2 HYPERTROPHIC CARDIOMYOPATHY: Primary | ICD-10-CM

## 2018-04-09 DIAGNOSIS — I25.10 CORONARY ARTERY DISEASE INVOLVING NATIVE CORONARY ARTERY OF NATIVE HEART WITHOUT ANGINA PECTORIS: ICD-10-CM

## 2018-04-09 DIAGNOSIS — Z95.810 AUTOMATIC IMPLANTABLE CARDIOVERTER-DEFIBRILLATOR IN SITU: ICD-10-CM

## 2018-04-09 DIAGNOSIS — I47.20 VENTRICULAR TACHYCARDIA: ICD-10-CM

## 2018-04-09 DIAGNOSIS — I10 ESSENTIAL HYPERTENSION: ICD-10-CM

## 2018-04-09 DIAGNOSIS — E11.9 TYPE 2 DIABETES MELLITUS WITHOUT RETINOPATHY: ICD-10-CM

## 2018-04-09 DIAGNOSIS — I44.2 AV BLOCK, COMPLETE: ICD-10-CM

## 2018-04-09 PROCEDURE — 99213 OFFICE O/P EST LOW 20 MIN: CPT | Mod: PBBFAC,PO,25 | Performed by: INTERNAL MEDICINE

## 2018-04-09 PROCEDURE — 99214 OFFICE O/P EST MOD 30 MIN: CPT | Mod: S$PBB,,, | Performed by: INTERNAL MEDICINE

## 2018-04-09 PROCEDURE — 99999 PR PBB SHADOW E&M-EST. PATIENT-LVL III: CPT | Mod: PBBFAC,,, | Performed by: INTERNAL MEDICINE

## 2018-04-09 PROCEDURE — 93283 PRGRMG EVAL IMPLANTABLE DFB: CPT | Mod: PBBFAC,PO | Performed by: INTERNAL MEDICINE

## 2018-04-09 NOTE — PROGRESS NOTES
Subjective:    Patient ID:  Inge Monge is a 68 y.o. female who presents for follow-up of Tachycardia (Ref by Dr. Sneed )      HPI 67 yo female with Hypertrophic cardiomyopathy s/p alcohol septal ablation, Htn, DM, ICD, AV block, VT, COPD.  Primary cardiologist is Dr. Sneed.  Has seen Dr. Bernal in the past.  Echo's have reported normal biventricular structure and function, last one 3/16, no recurrence of gradient.  Has ICD for primary prevention.  Device interrogations have revealed increase in % RV pacing to 97%, with progression to complete AV block. One episode of VT 8/9  at 151 bpm, lasting 18 seconds.    Recently had inhalers resumed by Dr. Plata.  Device interrogation reveals underlying atrial rate is 150 bpm.  Having mode switching for what appears to be sinus/atrial tachycardia at 150 bpm.  Notes dyspnea on interrogation.  Improving with inhalers.    She has poor insight into her medical care overall.  Also has had challenges with compliance.  No-showed last two appts with me.          Review of Systems   Constitution: Negative. Negative for weakness and malaise/fatigue.   Cardiovascular: Positive for dyspnea on exertion. Negative for chest pain, irregular heartbeat, leg swelling, near-syncope, orthopnea, palpitations, paroxysmal nocturnal dyspnea and syncope.   Respiratory: Positive for shortness of breath. Negative for cough.    Neurological: Negative for dizziness and light-headedness.   All other systems reviewed and are negative.       Objective:    Physical Exam   Constitutional: She is oriented to person, place, and time. She appears well-developed and well-nourished.   Eyes: Conjunctivae are normal. No scleral icterus.   Neck: No JVD present. No tracheal deviation present.   Cardiovascular: Normal rate and regular rhythm.  PMI is not displaced.    Pulmonary/Chest: Effort normal and breath sounds normal. No respiratory distress.   Abdominal: Soft. There is no hepatosplenomegaly. There is no  tenderness.   Musculoskeletal: She exhibits no edema or tenderness.   Neurological: She is alert and oriented to person, place, and time.   Skin: Skin is warm and dry. No rash noted.   Psychiatric: She has a normal mood and affect. Her behavior is normal.         Assessment:       1. Hypertrophic cardiomyopathy    2. Essential hypertension    3. Coronary artery disease involving native coronary artery of native heart without angina pectoris    4. Automatic implantable cardioverter-defibrillator in situ    5. Ventricular tachycardia    6. Type 2 diabetes mellitus without retinopathy - Both Eyes         Plan:       Had two episodes of elevated HR.  Suspect this is sinus tachycardia.  24 holter monitor.  Reprogram atrial filter rate to 170 bpm.  Tried to answer all of her questions.  F/u with monitoring as scheduled, me in 1 year.

## 2018-04-25 ENCOUNTER — TELEPHONE (OUTPATIENT)
Dept: CARDIOLOGY | Facility: CLINIC | Age: 68
End: 2018-04-25

## 2018-04-25 NOTE — TELEPHONE ENCOUNTER
----- Message from Macie Lara sent at 4/25/2018  4:18 PM CDT -----  FYI:states that elec is out, will not have results..[phone] (home)

## 2018-05-04 RX ORDER — PRAVASTATIN SODIUM 20 MG/1
TABLET ORAL
Qty: 90 TABLET | Refills: 0 | Status: SHIPPED | OUTPATIENT
Start: 2018-05-04 | End: 2018-05-24 | Stop reason: SDUPTHER

## 2018-05-15 RX ORDER — METOPROLOL SUCCINATE 200 MG/1
TABLET, EXTENDED RELEASE ORAL
Qty: 180 TABLET | Refills: 0 | Status: SHIPPED | OUTPATIENT
Start: 2018-05-15 | End: 2018-08-06 | Stop reason: SDUPTHER

## 2018-05-21 ENCOUNTER — CLINICAL SUPPORT (OUTPATIENT)
Dept: CARDIOLOGY | Facility: CLINIC | Age: 68
End: 2018-05-21
Attending: INTERNAL MEDICINE
Payer: MEDICARE

## 2018-05-21 DIAGNOSIS — I47.20 VENTRICULAR TACHYCARDIA: ICD-10-CM

## 2018-05-21 PROCEDURE — 93226 XTRNL ECG REC<48 HR SCAN A/R: CPT | Mod: PBBFAC,PO | Performed by: INTERNAL MEDICINE

## 2018-05-21 PROCEDURE — 93227 XTRNL ECG REC<48 HR R&I: CPT | Mod: S$PBB,,, | Performed by: INTERNAL MEDICINE

## 2018-05-24 RX ORDER — VERAPAMIL HYDROCHLORIDE 240 MG/1
TABLET, FILM COATED, EXTENDED RELEASE ORAL
Qty: 180 TABLET | Refills: 4 | Status: SHIPPED | OUTPATIENT
Start: 2018-05-24 | End: 2018-10-26 | Stop reason: SDUPTHER

## 2018-05-24 RX ORDER — METFORMIN HYDROCHLORIDE 750 MG/1
750 TABLET, EXTENDED RELEASE ORAL NIGHTLY
Qty: 90 TABLET | Refills: 3 | Status: SHIPPED | OUTPATIENT
Start: 2018-05-24 | End: 2019-05-02 | Stop reason: SDUPTHER

## 2018-05-24 RX ORDER — FENOFIBRIC ACID 135 MG/1
1 CAPSULE, DELAYED RELEASE ORAL DAILY
Qty: 90 CAPSULE | Refills: 3 | Status: SHIPPED | OUTPATIENT
Start: 2018-05-24 | End: 2018-11-14

## 2018-05-24 RX ORDER — RAMIPRIL 10 MG/1
CAPSULE ORAL
Qty: 90 CAPSULE | Refills: 3 | Status: SHIPPED | OUTPATIENT
Start: 2018-05-24 | End: 2019-05-02 | Stop reason: SDUPTHER

## 2018-05-24 RX ORDER — PRAVASTATIN SODIUM 20 MG/1
TABLET ORAL
Qty: 90 TABLET | Refills: 3 | Status: SHIPPED | OUTPATIENT
Start: 2018-05-24 | End: 2019-05-02 | Stop reason: SDUPTHER

## 2018-05-24 NOTE — TELEPHONE ENCOUNTER
----- Message from Ariane Wilson sent at 5/24/2018  1:31 PM CDT -----  Contact: Inge  Type:  RX Refill Request    Who Called:  Patient   Refill or New Rx:  Refill   RX Name and Strength:  metformin (GLUCOPHAGE-XR) 750 MG 24 hr tablet  How is the patient currently taking it? (ex. 1XDay):  Take 1 tablet (750 mg total) by mouth every evening (prior authorization needed)  Is this a 30 day or 90 day RX:  30  Preferred Pharmacy with phone number:    [id number] - [place], [place] - [facility] Blair Garcia  [address] [place] [address]  Phone: [phone] Fax: [phone]  Local or Mail Order:  Local  Ordering Provider:  Dr Roverto Marshall Call Back Number:  [phone]  Additional Information:  na

## 2018-05-24 NOTE — TELEPHONE ENCOUNTER
----- Message from Arianecorey Wilson sent at 5/24/2018  1:25 PM CDT -----  Contact: Inge  Type:  RX Refill Request    Who Called:  Patient   Refill or New Rx:  refill  RX Name and Strength:    1. fenofibric acid (FIBRICOR) 135 mg CpDR  2. pravastatin (PRAVACHOL) 20 MG tablet (prior authorization needed)  3. ramipril (ALTACE) 10 MG capsule (prior authorization needed)  4. verapamil (CALAN-SR) 240 MG CR tablet (prior authorization needed)  How is the patient currently taking it? (ex. 1XDay):    1. Take 1 capsule (135 mg total) by mouth once daily  2. TAKE 1 TABLET(20 MG) BY MOUTH EVERY EVENING  3. TAKE 1 CAPSULE(10 MG) BY MOUTH EVERY DAY  4. TAKE 1 TABLET(240 MG) BY MOUTH TWICE DAILY  Is this a 30 day or 90 day RX:  30  Preferred Pharmacy with phone number:    [facility] [id number] [place] [address] [address] [place] [facility] Blair Garcia  [address] [place] [address]  Phone: [phone] Fax: [phone]  Local or Mail Order:  Local  Ordering Provider:  Dr Rony Sneed  Best Call Back Number:  [phone]  Additional Information:  Out of all medication. Thanks!

## 2018-06-15 ENCOUNTER — TELEPHONE (OUTPATIENT)
Dept: FAMILY MEDICINE | Facility: CLINIC | Age: 68
End: 2018-06-15

## 2018-06-18 ENCOUNTER — DOCUMENTATION ONLY (OUTPATIENT)
Dept: FAMILY MEDICINE | Facility: CLINIC | Age: 68
End: 2018-06-18

## 2018-06-18 NOTE — PROGRESS NOTES
Prior auth initiated on 6-18-18 for butalbital-acetaminophen-caffeine -40 mg.    Cmm Key: [id number]    Questions answered via phone on 6-19-18.    Status: Approved

## 2018-06-19 ENCOUNTER — TELEPHONE (OUTPATIENT)
Dept: FAMILY MEDICINE | Facility: CLINIC | Age: 68
End: 2018-06-19

## 2018-06-19 NOTE — TELEPHONE ENCOUNTER
----- Message from Krystyna Thornton sent at 6/19/2018  8:39 AM CDT -----  Type: Needs Medical Advice    Who Called:  Chely Marshall Call Back Number: [phone]  Additional Information: They are the prior authorization department and needs to get a few more questions answered before they can authorize the butalbital-acetaminophen-caffeine -40 mg (FIORICET, ESGIC) -40 mg per tablet. Please call back.

## 2018-06-22 ENCOUNTER — TELEPHONE (OUTPATIENT)
Dept: FAMILY MEDICINE | Facility: CLINIC | Age: 68
End: 2018-06-22

## 2018-07-05 RX ORDER — FENOFIBRIC ACID 135 MG/1
CAPSULE, DELAYED RELEASE ORAL
Qty: 90 CAPSULE | Refills: 0 | Status: SHIPPED | OUTPATIENT
Start: 2018-07-05 | End: 2018-09-29 | Stop reason: SDUPTHER

## 2018-08-06 DIAGNOSIS — I10 HYPERTENSION, UNSPECIFIED TYPE: Primary | ICD-10-CM

## 2018-08-06 RX ORDER — METOPROLOL SUCCINATE 200 MG/1
TABLET, EXTENDED RELEASE ORAL
Qty: 180 TABLET | Refills: 0 | Status: SHIPPED | OUTPATIENT
Start: 2018-08-06 | End: 2018-10-26

## 2018-08-17 DIAGNOSIS — E11.9 TYPE 2 DIABETES MELLITUS WITHOUT COMPLICATION: ICD-10-CM

## 2018-08-22 ENCOUNTER — PATIENT OUTREACH (OUTPATIENT)
Dept: ADMINISTRATIVE | Facility: HOSPITAL | Age: 68
End: 2018-08-22

## 2018-08-22 NOTE — LETTER
August 22, 2018    Inge Monge  [address] [place] [address]             [facility]  [address]  [place] [address]  Phone: [phone] Dear Ms. Moneg:    [facility] is committed to your overall health and would like to ensure that you are up to date on your recommended test and/or procedures.   Dr. Davis has found that your chart shows you may be due for the following:    Colon cancer screening  Diabetic lab test    If you have had any of the above done at another facility, please let us know so that we may obtain copies from that facility.  If you have a copy of these records, please provide a copy for us to scan into your chart.  You are welcome to request that the report be faxed to us at  ([phone]).      Otherwise, please schedule these appointments at your earliest convenience by calling [phone] or going to [email or web address].        If you have any questions or concerns, please don't hesitate to call.    Sincerely,    Leyda Mccall  Clinical Care Coordinator  [facility] [address] [place] [address]  Phone: [phone]   Fax: [phone]

## 2018-08-22 NOTE — PROGRESS NOTES
Health Maintenance Due   Topic Date Due    Fecal Occult Blood Test (FOBT)/FitKit  1950    Influenza Vaccine  08/01/2018    Hemoglobin A1c  08/08/2018     Letter mailed

## 2018-09-26 RX ORDER — RABEPRAZOLE SODIUM 20 MG/1
TABLET, DELAYED RELEASE ORAL
Qty: 90 TABLET | Refills: 3 | Status: SHIPPED | OUTPATIENT
Start: 2018-09-26 | End: 2019-05-02 | Stop reason: SDUPTHER

## 2018-09-30 RX ORDER — FENOFIBRIC ACID 135 MG/1
CAPSULE, DELAYED RELEASE ORAL
Qty: 90 CAPSULE | Refills: 0 | Status: SHIPPED | OUTPATIENT
Start: 2018-09-30 | End: 2018-11-14

## 2018-10-04 DIAGNOSIS — I47.20 VENTRICULAR TACHYARRHYTHMIA: ICD-10-CM

## 2018-10-04 DIAGNOSIS — Z95.810 AUTOMATIC IMPLANTABLE CARDIOVERTER-DEFIBRILLATOR IN SITU: Primary | ICD-10-CM

## 2018-10-25 ENCOUNTER — CLINICAL SUPPORT (OUTPATIENT)
Dept: CARDIOLOGY | Facility: CLINIC | Age: 68
End: 2018-10-25
Attending: INTERNAL MEDICINE
Payer: MEDICARE

## 2018-10-25 DIAGNOSIS — I47.20 VENTRICULAR TACHYARRHYTHMIA: ICD-10-CM

## 2018-10-25 DIAGNOSIS — Z95.810 AUTOMATIC IMPLANTABLE CARDIOVERTER-DEFIBRILLATOR IN SITU: ICD-10-CM

## 2018-10-25 PROCEDURE — 93283 PRGRMG EVAL IMPLANTABLE DFB: CPT | Mod: PBBFAC,PO | Performed by: INTERNAL MEDICINE

## 2018-10-26 ENCOUNTER — OFFICE VISIT (OUTPATIENT)
Dept: FAMILY MEDICINE | Facility: CLINIC | Age: 68
End: 2018-10-26
Payer: MEDICARE

## 2018-10-26 VITALS
SYSTOLIC BLOOD PRESSURE: 116 MMHG | TEMPERATURE: 98 F | BODY MASS INDEX: 27.81 KG/M2 | OXYGEN SATURATION: 95 % | HEIGHT: 65 IN | WEIGHT: 166.88 LBS | DIASTOLIC BLOOD PRESSURE: 70 MMHG | RESPIRATION RATE: 22 BRPM | HEART RATE: 70 BPM

## 2018-10-26 DIAGNOSIS — R05.9 COUGH: ICD-10-CM

## 2018-10-26 DIAGNOSIS — R20.2 HAND PARESTHESIA, UNSPECIFIED LATERALITY: ICD-10-CM

## 2018-10-26 DIAGNOSIS — E11.9 TYPE 2 DIABETES MELLITUS WITHOUT COMPLICATION, WITHOUT LONG-TERM CURRENT USE OF INSULIN: ICD-10-CM

## 2018-10-26 DIAGNOSIS — I10 ESSENTIAL HYPERTENSION: ICD-10-CM

## 2018-10-26 DIAGNOSIS — J04.0 LARYNGITIS: ICD-10-CM

## 2018-10-26 DIAGNOSIS — J06.9 UPPER RESPIRATORY TRACT INFECTION, UNSPECIFIED TYPE: Primary | ICD-10-CM

## 2018-10-26 LAB
AV DELAY - FIXED: 140 MSEC
CHARGE TIME (SEC): 10.1 SEC
HV IMPEDANCE (OHM): 52 OHM
IMPEDANCE RA LEAD (NATIVE): 430 OHMS
IMPEDANCE RA LEAD: 490 OHMS
OHS CV DC PP MS1: 0.5 MS
OHS CV DC PP MS2: 0.7 MS
OHS CV DC PP V1: 2 V
OHS CV DC PP V2: 2.5 V
P/R-WAVE RA LEAD (NATIVE): 10.9 MV
P/R-WAVE RA LEAD: 5 MV
THRESHOLD MS RA LEAD (NATIVE): 0.7 MS
THRESHOLD MS RA LEAD: 0.5 MS
THRESHOLD V RA LEAD (NATIVE): 0.75 V
THRESHOLD V RA LEAD: 0.75 V

## 2018-10-26 PROCEDURE — 99214 OFFICE O/P EST MOD 30 MIN: CPT | Mod: S$PBB,,, | Performed by: FAMILY MEDICINE

## 2018-10-26 PROCEDURE — 99214 OFFICE O/P EST MOD 30 MIN: CPT | Mod: PBBFAC,PO | Performed by: FAMILY MEDICINE

## 2018-10-26 PROCEDURE — 99999 PR PBB SHADOW E&M-EST. PATIENT-LVL IV: CPT | Mod: PBBFAC,,, | Performed by: FAMILY MEDICINE

## 2018-10-26 RX ORDER — VERAPAMIL HYDROCHLORIDE 240 MG/1
TABLET, FILM COATED, EXTENDED RELEASE ORAL
Qty: 180 TABLET | Refills: 3 | Status: SHIPPED | OUTPATIENT
Start: 2018-10-26 | End: 2019-05-02 | Stop reason: SDUPTHER

## 2018-10-26 RX ORDER — CODEINE PHOSPHATE AND GUAIFENESIN 10; 100 MG/5ML; MG/5ML
5 SOLUTION ORAL 3 TIMES DAILY PRN
Qty: 180 ML | Refills: 0 | Status: SHIPPED | OUTPATIENT
Start: 2018-10-26 | End: 2018-11-05

## 2018-10-26 NOTE — PROGRESS NOTES
"Chief Complaint   Patient presents with    Cough     thick greenish yellow, gray started about 2 days ago, headache    Fatigue     slight tingling in right hand, worse yesterday       HISTORY OF PRESENT ILLNESS:     C/o 2 day h/o chest congestion, cough, green sputum, loss of voice, rhinorrhea.  No wheezing or rattling  No fever.  Requesting something for cough since this is keeping her awake.    She had some tingling in bilateral hands yesterday which started after long car ride.    DM2 - doing well on present regimen of janumet twice a day and metformin 750mg QHS; BG's 100; lost 14 pounds since last appt  HLD - tolerating Trilipix and Pravastatin 10mg daily  HTN - tolerating Ramipril 10mg daily , metoprolol and Verapamil; using lasix as needed but rarely  CAD - following with Dr. Masterson  COPD - stable off Advair and Spiriva; using albuterol   GERD - taking Aciphex daily    PAST MEDICAL HISTORY:   Type 2 diabetes, coronary artery disease, COPD,   hypertension, mitral valve regurgitation, hypertrophic cardiomyopathy s/p dual chamber,   hypertriglyceridemia, peptic ulcer disease, osteopenia, elevated CPK (worse with statins)sz and colon polyps.     REVIEW OF SYSTEMS: She denies any chest pain, shortness of breath,   dizziness, palpitations, and lower extremity swelling.   Occ sputum production    PHYSICAL EXAM:   /70 (BP Location: Left arm, Patient Position: Sitting)   Pulse 70   Temp 97.5 °F (36.4 °C)   Resp (!) 22   Ht 5' 5" (1.651 m)   Wt 75.7 kg (166 lb 14.2 oz)   SpO2 95%   BMI 27.77 kg/m²   GENERAL: A healthy-appearing 66-year-old white female, who sitting   upright, in no apparent distress, alert and oriented x4.   Posterior oropharynx with some PND  TMs clear  NECK: Supple. There is no lymphadenopathy, thyromegaly or JVD.   CHEST: Clear to auscultation bilaterally with good respiratory movement.   CARDIOVASCULAR: S1 and S2. Regular rate and rhythm. No murmurs, rubs or gallops.   ABDOMEN: Benign. "   EXTREMITIES: Show no cyanosis, clubbing or edema.   Bilateral hands with good cap refill and strong pulses; negative tinnels bilaterally  2+ bilateral UE reflexes with normal strength    Results for orders placed or performed in visit on 10/25/18   Cardiac device check - In Clinic   Result Value Ref Range    AV Delay - Fixed 140 msec    Charge Time (sec) 10.1 sec    HV Impedance (Ohm) 52 Ohm    P/R-wave RA Lead 5.0 mV    P/R-wave RA Lead (native) 10.9 mV    Impedance RA Lead 490 Ohms    Impedance RA Lead (native) 430 Ohms    Threshold V RA Lead 0.75 V    Theshold ms RA Lead 0.5 ms    Threshold V RA Lead (native) 0.75 V    Threshold ms RA Lead (native) 0.7 ms    Threshold V RA Lead 2.0 V    Threshold V RA Lead (native) 2.5 V    Theshold ms RA Lead 0.5 ms    Threshold ms RA Lead (native) 0.7 ms     Upper respiratory tract infection, unspecified type    Laryngitis    Hand paresthesia, unspecified laterality    Cough  -     guaifenesin-codeine 100-10 mg/5 ml (CHERATUSSIN AC)  mg/5 mL syrup; Take 5 mLs by mouth 3 (three) times daily as needed for Cough.  Dispense: 180 mL; Refill: 0    Type 2 diabetes mellitus without complication, without long-term current use of insulin    Essential hypertension    Other orders  -     verapamil (CALAN-SR) 240 MG CR tablet; TAKE 1 TABLET(240 MG) BY MOUTH TWICE DAILY  Dispense: 180 tablet; Refill: 3      Deep breathing exercises discussed.  Increase fluids.  Mucinex BID PRN for congestion.  Basic wrist stretching and wrist positioning for likely transient carpal tunnel syndrome symptoms  F/u with pulmonology and cardiology as planned  continue Trilipix,  pravastatin 20mg daily; Krill fish oil 1,000mg daily  Continue present meds  continue diabetic diet efforts and present meds  F/u 6 months with labs.

## 2018-11-14 RX ORDER — FENOFIBRIC ACID 135 MG/1
CAPSULE, DELAYED RELEASE ORAL
Qty: 90 CAPSULE | Refills: 0 | Status: SHIPPED | OUTPATIENT
Start: 2018-11-14 | End: 2019-03-01 | Stop reason: SDUPTHER

## 2018-12-04 ENCOUNTER — OFFICE VISIT (OUTPATIENT)
Dept: CARDIOLOGY | Facility: CLINIC | Age: 68
End: 2018-12-04
Payer: MEDICARE

## 2018-12-04 VITALS — WEIGHT: 157.19 LBS | HEIGHT: 65 IN | BODY MASS INDEX: 26.19 KG/M2

## 2018-12-04 DIAGNOSIS — I42.2 HYPERTROPHIC CARDIOMYOPATHY: ICD-10-CM

## 2018-12-04 DIAGNOSIS — I10 ESSENTIAL HYPERTENSION: ICD-10-CM

## 2018-12-04 DIAGNOSIS — Z95.810 AUTOMATIC IMPLANTABLE CARDIOVERTER-DEFIBRILLATOR IN SITU: ICD-10-CM

## 2018-12-04 DIAGNOSIS — I44.2 AV BLOCK, COMPLETE: ICD-10-CM

## 2018-12-04 DIAGNOSIS — I25.10 CORONARY ARTERY DISEASE INVOLVING NATIVE CORONARY ARTERY OF NATIVE HEART WITHOUT ANGINA PECTORIS: Primary | ICD-10-CM

## 2018-12-04 PROCEDURE — 99212 OFFICE O/P EST SF 10 MIN: CPT | Mod: PBBFAC,PO | Performed by: INTERNAL MEDICINE

## 2018-12-04 PROCEDURE — 99214 OFFICE O/P EST MOD 30 MIN: CPT | Mod: S$PBB,,, | Performed by: INTERNAL MEDICINE

## 2018-12-04 PROCEDURE — 99999 PR PBB SHADOW E&M-EST. PATIENT-LVL II: CPT | Mod: PBBFAC,,, | Performed by: INTERNAL MEDICINE

## 2018-12-04 RX ORDER — AMOXICILLIN AND CLAVULANATE POTASSIUM 875; 125 MG/1; MG/1
1 TABLET, FILM COATED ORAL 2 TIMES DAILY
Qty: 20 TABLET | Refills: 0 | Status: ON HOLD | OUTPATIENT
Start: 2018-12-04 | End: 2019-03-22 | Stop reason: HOSPADM

## 2018-12-04 NOTE — PROGRESS NOTES
Subjective:    Patient ID:  Inge Monge is a 68 y.o. female who presents for follow-up of HOCM, ICD, CAD    HPI  She comes with no complaints, no chest pain, no shortness of breath  Main complaint is productive cough    Review of Systems   Constitution: Negative for decreased appetite, weakness, malaise/fatigue, weight gain and weight loss.   Cardiovascular: Negative for chest pain, dyspnea on exertion, leg swelling, palpitations and syncope.   Respiratory: Negative for cough and shortness of breath.    Gastrointestinal: Negative.    All other systems reviewed and are negative.       Objective:      Physical Exam   Constitutional: She is oriented to person, place, and time. She appears well-developed and well-nourished.   HENT:   Head: Normocephalic.   Eyes: Pupils are equal, round, and reactive to light.   Neck: Normal range of motion. Neck supple. No JVD present. Carotid bruit is not present. No thyromegaly present.   Cardiovascular: Normal rate, regular rhythm, normal heart sounds, intact distal pulses and normal pulses. PMI is not displaced. Exam reveals no gallop.   No murmur heard.  Pulmonary/Chest: Effort normal and breath sounds normal.   Abdominal: Soft. Normal appearance. She exhibits no mass. There is no hepatosplenomegaly. There is no tenderness.   Musculoskeletal: Normal range of motion. She exhibits no edema.   Neurological: She is alert and oriented to person, place, and time. She has normal strength and normal reflexes. No sensory deficit.   Skin: Skin is warm and intact.   Psychiatric: She has a normal mood and affect.   Nursing note and vitals reviewed.        Assessment:       1. Coronary artery disease involving native coronary artery of native heart without angina pectoris    2. Automatic implantable cardioverter-defibrillator in situ    3. Essential hypertension    4. Hypertrophic cardiomyopathy    5. AV block, complete         Plan:     Continue all cardiac medications  Regular exercise  program  6 m f/u

## 2019-01-07 ENCOUNTER — PES CALL (OUTPATIENT)
Dept: ADMINISTRATIVE | Facility: CLINIC | Age: 69
End: 2019-01-07

## 2019-01-15 DIAGNOSIS — E11.8 TYPE 2 DIABETES MELLITUS WITH COMPLICATION, WITHOUT LONG-TERM CURRENT USE OF INSULIN: ICD-10-CM

## 2019-01-15 DIAGNOSIS — E78.5 HYPERLIPIDEMIA, UNSPECIFIED HYPERLIPIDEMIA TYPE: Primary | ICD-10-CM

## 2019-01-15 DIAGNOSIS — I10 ESSENTIAL HYPERTENSION: ICD-10-CM

## 2019-01-15 RX ORDER — SITAGLIPTIN AND METFORMIN HYDROCHLORIDE 500; 50 MG/1; MG/1
TABLET, FILM COATED ORAL
Qty: 180 TABLET | Refills: 0 | OUTPATIENT
Start: 2019-01-15

## 2019-01-15 RX ORDER — SITAGLIPTIN AND METFORMIN HYDROCHLORIDE 500; 50 MG/1; MG/1
TABLET, FILM COATED ORAL
Qty: 180 TABLET | Refills: 0 | Status: CANCELLED | OUTPATIENT
Start: 2019-01-15

## 2019-01-20 DIAGNOSIS — I10 HYPERTENSION, UNSPECIFIED TYPE: ICD-10-CM

## 2019-01-21 RX ORDER — METOPROLOL SUCCINATE 200 MG/1
TABLET, EXTENDED RELEASE ORAL
Qty: 180 TABLET | Refills: 0 | Status: SHIPPED | OUTPATIENT
Start: 2019-01-21 | End: 2019-03-19 | Stop reason: SDUPTHER

## 2019-02-08 ENCOUNTER — CLINICAL SUPPORT (OUTPATIENT)
Dept: CARDIOLOGY | Facility: CLINIC | Age: 69
End: 2019-02-08
Attending: INTERNAL MEDICINE
Payer: MEDICARE

## 2019-02-08 DIAGNOSIS — I44.2 AV BLOCK, COMPLETE: ICD-10-CM

## 2019-02-08 DIAGNOSIS — Z95.810 AUTOMATIC IMPLANTABLE CARDIOVERTER-DEFIBRILLATOR IN SITU: ICD-10-CM

## 2019-02-08 DIAGNOSIS — Z95.810 AUTOMATIC IMPLANTABLE CARDIOVERTER-DEFIBRILLATOR IN SITU: Primary | ICD-10-CM

## 2019-02-08 LAB
AV DELAY - FIXED: 140 MSEC
CHARGE TIME (SEC): 10.1 SEC
HV IMPEDANCE (OHM): 50 OHM
IMPEDANCE RA LEAD (NATIVE): 340 OHMS
IMPEDANCE RA LEAD: 540 OHMS
OHS CV DC PP MS1: 0.5 MS
OHS CV DC PP MS2: 0.7 MS
OHS CV DC PP V1: 2 V
OHS CV DC PP V2: 2.5 V
P/R-WAVE RA LEAD (NATIVE): 12 MV
P/R-WAVE RA LEAD: 5 MV

## 2019-02-08 PROCEDURE — 93295 DEV INTERROG REMOTE 1/2/MLT: CPT | Mod: ,,, | Performed by: INTERNAL MEDICINE

## 2019-02-08 PROCEDURE — 93295 CARDIAC DEVICE CHECK - REMOTE: ICD-10-PCS | Mod: ,,, | Performed by: INTERNAL MEDICINE

## 2019-02-08 PROCEDURE — 93296 REM INTERROG EVL PM/IDS: CPT | Mod: PBBFAC,PO | Performed by: INTERNAL MEDICINE

## 2019-02-18 ENCOUNTER — TELEPHONE (OUTPATIENT)
Dept: CARDIOLOGY | Facility: CLINIC | Age: 69
End: 2019-02-18

## 2019-02-18 NOTE — TELEPHONE ENCOUNTER
Spoke with the patient she has been given the number to medical records regarding an IVC filter. I attempted to look up her records and did not have that information on file. [facility]

## 2019-02-18 NOTE — TELEPHONE ENCOUNTER
----- Message from Ismael Castillo sent at 2/18/2019 11:04 AM CST -----  Type:  Patient Call Back    Who Called:  pt  Does the patient know what this is regarding?: pt wants to know what kind of blood clot filter she has.  Best Call Back Number:  [phone]  Additional Information:  Please call pt and leave a detailed message if there is no answer.

## 2019-03-04 RX ORDER — FENOFIBRIC ACID 135 MG/1
CAPSULE, DELAYED RELEASE ORAL
Qty: 90 CAPSULE | Refills: 0 | Status: SHIPPED | OUTPATIENT
Start: 2019-03-04 | End: 2019-05-02 | Stop reason: SDUPTHER

## 2019-03-19 ENCOUNTER — CLINICAL SUPPORT (OUTPATIENT)
Dept: CARDIOLOGY | Facility: CLINIC | Age: 69
End: 2019-03-19
Attending: INTERNAL MEDICINE
Payer: MEDICARE

## 2019-03-19 ENCOUNTER — OFFICE VISIT (OUTPATIENT)
Dept: PRIMARY CARE CLINIC | Facility: CLINIC | Age: 69
End: 2019-03-19
Payer: MEDICARE

## 2019-03-19 ENCOUNTER — LAB VISIT (OUTPATIENT)
Dept: LAB | Facility: HOSPITAL | Age: 69
End: 2019-03-19
Attending: NURSE PRACTITIONER
Payer: MEDICARE

## 2019-03-19 VITALS
HEART RATE: 77 BPM | TEMPERATURE: 98 F | BODY MASS INDEX: 26.19 KG/M2 | OXYGEN SATURATION: 95 % | DIASTOLIC BLOOD PRESSURE: 62 MMHG | WEIGHT: 157.19 LBS | HEIGHT: 65 IN | SYSTOLIC BLOOD PRESSURE: 108 MMHG

## 2019-03-19 DIAGNOSIS — J18.9 PNEUMONIA OF RIGHT LOWER LOBE DUE TO INFECTIOUS ORGANISM: ICD-10-CM

## 2019-03-19 DIAGNOSIS — R06.2 WHEEZING: ICD-10-CM

## 2019-03-19 DIAGNOSIS — Z86.39 HISTORY OF LOW POTASSIUM: ICD-10-CM

## 2019-03-19 DIAGNOSIS — E87.6 HYPOKALEMIA: Primary | ICD-10-CM

## 2019-03-19 DIAGNOSIS — Z95.810 AUTOMATIC IMPLANTABLE CARDIOVERTER-DEFIBRILLATOR IN SITU: ICD-10-CM

## 2019-03-19 DIAGNOSIS — I44.2 AV BLOCK, COMPLETE: ICD-10-CM

## 2019-03-19 PROBLEM — E83.42 HYPOMAGNESEMIA: Status: ACTIVE | Noted: 2019-03-19

## 2019-03-19 PROBLEM — R19.7 DIARRHEA: Status: ACTIVE | Noted: 2019-03-19

## 2019-03-19 PROBLEM — J44.9 COPD (CHRONIC OBSTRUCTIVE PULMONARY DISEASE): Status: ACTIVE | Noted: 2019-03-19

## 2019-03-19 LAB
ANION GAP SERPL CALC-SCNC: 14 MMOL/L
BUN SERPL-MCNC: 15 MG/DL
CALCIUM SERPL-MCNC: 8.6 MG/DL
CHLORIDE SERPL-SCNC: 98 MMOL/L
CO2 SERPL-SCNC: 29 MMOL/L
CREAT SERPL-MCNC: 0.9 MG/DL
EST. GFR  (AFRICAN AMERICAN): >60 ML/MIN/1.73 M^2
EST. GFR  (NON AFRICAN AMERICAN): >60 ML/MIN/1.73 M^2
GLUCOSE SERPL-MCNC: 100 MG/DL
POTASSIUM SERPL-SCNC: 2.7 MMOL/L
SODIUM SERPL-SCNC: 141 MMOL/L

## 2019-03-19 PROCEDURE — 99214 PR OFFICE/OUTPT VISIT, EST, LEVL IV, 30-39 MIN: ICD-10-PCS | Mod: S$PBB,,, | Performed by: NURSE PRACTITIONER

## 2019-03-19 PROCEDURE — 99999 PR PBB SHADOW E&M-EST. PATIENT-LVL V: CPT | Mod: PBBFAC,,, | Performed by: NURSE PRACTITIONER

## 2019-03-19 PROCEDURE — 99215 OFFICE O/P EST HI 40 MIN: CPT | Mod: PBBFAC,PO,25 | Performed by: NURSE PRACTITIONER

## 2019-03-19 PROCEDURE — 99214 OFFICE O/P EST MOD 30 MIN: CPT | Mod: S$PBB,,, | Performed by: NURSE PRACTITIONER

## 2019-03-19 PROCEDURE — 80048 BASIC METABOLIC PNL TOTAL CA: CPT | Mod: PO

## 2019-03-19 PROCEDURE — 94640 AIRWAY INHALATION TREATMENT: CPT | Mod: PBBFAC,PO

## 2019-03-19 PROCEDURE — 99999 PR PBB SHADOW E&M-EST. PATIENT-LVL V: ICD-10-PCS | Mod: PBBFAC,,, | Performed by: NURSE PRACTITIONER

## 2019-03-19 PROCEDURE — 36415 COLL VENOUS BLD VENIPUNCTURE: CPT | Mod: PO

## 2019-03-19 RX ORDER — DOXYCYCLINE 100 MG/1
CAPSULE ORAL
Refills: 0 | Status: ON HOLD | COMMUNITY
Start: 2019-03-12 | End: 2019-03-22 | Stop reason: HOSPADM

## 2019-03-19 RX ORDER — ALBUTEROL SULFATE 0.83 MG/ML
2.5 SOLUTION RESPIRATORY (INHALATION) EVERY 6 HOURS PRN
Qty: 75 ML | Refills: 2 | Status: SHIPPED | OUTPATIENT
Start: 2019-03-19 | End: 2019-05-02 | Stop reason: SDUPTHER

## 2019-03-19 RX ORDER — ALBUTEROL SULFATE 90 UG/1
AEROSOL, METERED RESPIRATORY (INHALATION)
Refills: 0 | Status: ON HOLD | COMMUNITY
Start: 2019-03-12 | End: 2019-03-22 | Stop reason: HOSPADM

## 2019-03-19 RX ORDER — ALBUTEROL SULFATE 0.83 MG/ML
2.5 SOLUTION RESPIRATORY (INHALATION)
Status: DISCONTINUED | OUTPATIENT
Start: 2019-03-19 | End: 2019-03-19 | Stop reason: HOSPADM

## 2019-03-19 RX ADMIN — ALBUTEROL SULFATE 2.5 MG: 2.5 SOLUTION RESPIRATORY (INHALATION) at 03:03

## 2019-03-19 NOTE — PROGRESS NOTES
Inge Monge is a 69 y.o. female patient of Roverto Davis MD who presents to the clinic today for       Chief Complaint   Patient presents with    Cough   .     HPI     Pt, who is not known to me, reports a new problem to me: productive cough. Pt was seen at [facility] last week for the same problem and was diagnosed with pneumonia. She was started on doxycycline and albuterol (every 4 hours). Since then pt has still been coughing up phlegm (cloudy and yellow-green) with a low grade (99s).      Labs collected on 3/12/19 with Potassium of 2.4. Per sister, pt was told she needed to go to the hospital to be admitted, but the pt never went. She was never treated for the hypokalemia.     These symptoms began 2 weeks ago and status is slightly improved.      Symptoms are + acute.     Pt denies the following symptoms: body aches     Aggravating factors include none .     Relieving factors include none.     OTC Medications tried are tylenol.     Prescription medications taken for symptoms are Doxy and albuterol.     Pertinent medical history:  Recent Pneumonia. COPD     Smoking status:  Quit 2007     ROS     Constitutional:   Low-grade  fever, + fatigue, decrease in appetite.     Head:   + headache  Ears:   No pain but stopped up feeling.  Eyes:  No sxs  Nose:   No sinus pain, no congestion, + runny nose, + post nasal drip.  Throat:  No ST pain, no exudate, no difficulty swallowing.     Heart:  No palpitations, chest pain.     Lungs:  + difficulty breathing, + coughing, yellow-green sputum production, no wheezing.              Symptoms are community acquired. Took flu and pneumovac shot.     GI/:  No sxs     MS:  No new bone, joint or muscle problems.     Skin:  No rashes, itching.        PAST MEDICAL HISTORY:       Past Medical History:   Diagnosis Date    CAD (coronary artery disease)      Cataract      Chronic headache      Colon polyps      COPD (chronic obstructive pulmonary disease)      Diabetes  mellitus type II      Hyperlipidemia      Hypertension      Hypertrophic cardiomyopathy      ICD (implantable cardiac defibrillator) in place      Osteopenia      Peptic ulcer disease      Valvular heart disease           PAST SURGICAL HISTORY:        Past Surgical History:   Procedure Laterality Date    Angiogram with stents        BREAST CYST EXCISION Right       in her early 30's    CARDIAC DEFIBRILLATOR PLACEMENT   3 yrs    HYSTERECTOMY        left arm surgery             SOCIAL HISTORY:  Social History               Socioeconomic History    Marital status:        Spouse name: Not on file    Number of children: Not on file    Years of education: Not on file    Highest education level: Not on file   Social Needs    Financial resource strain: Not on file    Food insecurity - worry: Not on file    Food insecurity - inability: Not on file    Transportation needs - medical: Not on file    Transportation needs - non-medical: Not on file   Occupational History    Not on file   Tobacco Use    Smoking status: Former Smoker       Types: Cigarettes       Last attempt to quit: 2006       Years since quittin.3    Smokeless tobacco: Never Used   Substance and Sexual Activity    Alcohol use: No    Drug use: No    Sexual activity: Not on file   Other Topics Concern    Not on file   Social History Narrative    Not on file            FAMILY HISTORY:        Family History   Problem Relation Age of Onset    Heart disease Father      Cancer Father      Cataracts Mother      Cancer Sister      Diabetes Sister      Diabetes Paternal Grandmother      Amblyopia Neg Hx      Blindness Neg Hx      Glaucoma Neg Hx      Hypertension Neg Hx      Macular degeneration Neg Hx      Retinal detachment Neg Hx      Strabismus Neg Hx      Stroke Neg Hx      Thyroid disease Neg Hx           ALLERGIES AND MEDICATIONS: updated and reviewed.       Review of patient's allergies indicates:    Allergen Reactions    Codeine Nausea And Vomiting    Sulfa (sulfonamide antibiotics)        Current Medications          Current Outpatient Medications   Medication Sig Dispense Refill    albuterol (PROVENTIL/VENTOLIN HFA) 90 mcg/actuation inhaler INL 2 PFS PO Q 4 H PRN   0    amoxicillin-clavulanate 875-125mg (AUGMENTIN) 875-125 mg per tablet Take 1 tablet by mouth 2 (two) times daily. 20 tablet 0    butalbital-acetaminophen-caffeine -40 mg (FIORICET, ESGIC) -40 mg per tablet TAKE 1 TABLET BY MOUTH EVERY 6 HOURS AS NEEDED 120 tablet 5    CONTOUR TEST STRIPS Strp TEST BLOOD SUGARS 4 TIMES DAILY 150 strip 11    cyclobenzaprine (FLEXERIL) 10 MG tablet TAKE 1 TABLET(10 MG) BY MOUTH EVERY 8 HOURS 90 tablet 5    doxycycline (VIBRAMYCIN) 100 MG Cap TK ONE C PO BID   0    fenofibric acid (FIBRICOR) 135 mg CpDR TAKE 1 CAPSULE BY MOUTH EVERY DAY 90 capsule 0    metFORMIN (GLUCOPHAGE-XR) 750 MG 24 hr tablet Take 1 tablet (750 mg total) by mouth every evening. 90 tablet 3    metoprolol succinate (TOPROL-XL) 200 MG 24 hr tablet TAKE 1 TABLET(200 MG) BY MOUTH TWICE DAILY 180 tablet 4    pravastatin (PRAVACHOL) 20 MG tablet TAKE 1 TABLET(20 MG) BY MOUTH EVERY EVENING 90 tablet 3    RABEprazole (ACIPHEX) 20 mg tablet TAKE 1 TABLET BY MOUTH DAILY 90 tablet 3    ramipril (ALTACE) 10 MG capsule TAKE 1 CAPSULE(10 MG) BY MOUTH EVERY DAY 90 capsule 3    SITagliptan-metformin (JANUMET)  mg per tablet Take 1 tablet by mouth 2 (two) times daily. 180 tablet 3    verapamil (CALAN-SR) 240 MG CR tablet TAKE 1 TABLET(240 MG) BY MOUTH TWICE DAILY 180 tablet 3    fluticasone (FLONASE) 50 mcg/actuation nasal spray 2 sprays by Each Nare route 2 (two) times daily. 16 g 11      No current facility-administered medications for this visit.           PHYSICAL EXAM     Alert, coop 69 y.o. female patient in no visible distress.         Vitals:     03/19/19 1432   BP: 108/62   Pulse: 77   Temp: 97.6 °F (36.4 °C)      VS  reviewed.  VS stable.  CC, nursing note, medications & PMH all reviewed today.     Head:  Normocephalic, atraumatic.     EENT:  EACs clear.  TMs no erythema, + LR, no effusions, no TM perforation.                              Eye lids normal, no discharge present.                 Sinus tenderness to palp--none.               Nares--no edema, + d/c present.               Pharynx mildly injected.                Tonsils not erythematous, not enlarged, no exudate present.               + small NT anterior, no posterior cervical lymph nodes palpable.               + small NT submandibular lymph nodes palp.             Resp:  Respirations even, unlabored              Lungs coarse and wheezing bilaterally throughout, no crackles.  Deecreased air to bases bilat.              Post albuterol neb still with wheezing to bases bilaterally, but better air movement throughout.     Heart:  RRR, no MRG.     MS:  Ambulates steady.              NEURO:  Alert and oriented x 4.  Responds appropriately during interaction.     Skin:  Warm, dry, color good.    Hypokalemia    Pneumonia of right lower lobe due to infectious organism  -     albuterol (PROVENTIL) 2.5 mg /3 mL (0.083 %) nebulizer solution; Take 3 mLs (2.5 mg total) by nebulization every 6 (six) hours as needed for Wheezing. Rescue  Dispense: 75 mL; Refill: 2    Wheezing  -     albuterol nebulizer solution 2.5 mg    History of low potassium  -     Basic metabolic panel; Future; Expected date: 03/19/2019      Pt today presents with complaints of a productive cough that is continuing since being diagnosed with pneumonia at [facility] on 3/12/19. Pt has been taking doxycycline and using an albuterol inhaler ([id number]) with little relief. On exam, pt with decreased breath sounds and wheezing bilaterally. Albuterol neb given and improved breath sounds with less wheezing noted post treatment.     Records received from 3/12/19 visit. Pt with a potassium of 2.4 that she states she was  told to go the ER but never went.        This is a new problem to me and the following work up is planned.  Lab & Radiological Tests Ordered: BMP.  The results are hypokalemia.      Pt sent to lab for a stat BMP which returned a Potassium of 2.7. Pt told to go to ER for immediate treatment. Pt initially reluctant, but pts sister stated she would bring her. [facility]   ER called and report given.     Advised patient to go straight to the emergency room  Explained exam findings, diagnosis and treatment plan to patient and sister.  Questions answered and patient states understanding.

## 2019-03-19 NOTE — PATIENT INSTRUCTIONS
Go to the ER now.          Later:  Albuterol Nebulizer or inhaler every 4 hrs until improved  Maintain good hydration  Finish your antibiotics.  Diet as tolerated.

## 2019-03-20 PROBLEM — E87.6 HYPOKALEMIA: Status: ACTIVE | Noted: 2019-03-20

## 2019-03-26 ENCOUNTER — LAB VISIT (OUTPATIENT)
Dept: LAB | Facility: HOSPITAL | Age: 69
End: 2019-03-26
Attending: FAMILY MEDICINE
Payer: MEDICARE

## 2019-03-26 DIAGNOSIS — E11.8 TYPE 2 DIABETES MELLITUS WITH COMPLICATION, WITHOUT LONG-TERM CURRENT USE OF INSULIN: ICD-10-CM

## 2019-03-26 DIAGNOSIS — E78.5 HYPERLIPIDEMIA, UNSPECIFIED HYPERLIPIDEMIA TYPE: ICD-10-CM

## 2019-03-26 DIAGNOSIS — E11.9 TYPE 2 DIABETES MELLITUS WITHOUT COMPLICATION: ICD-10-CM

## 2019-03-26 DIAGNOSIS — I10 ESSENTIAL HYPERTENSION: ICD-10-CM

## 2019-03-26 LAB
ALBUMIN SERPL BCP-MCNC: 3.5 G/DL (ref 3.5–5.2)
ALP SERPL-CCNC: 43 U/L (ref 55–135)
ALT SERPL W/O P-5'-P-CCNC: 15 U/L (ref 10–44)
ANION GAP SERPL CALC-SCNC: 8 MMOL/L (ref 8–16)
AST SERPL-CCNC: 20 U/L (ref 10–40)
BILIRUB SERPL-MCNC: 0.4 MG/DL (ref 0.1–1)
BUN SERPL-MCNC: 9 MG/DL (ref 8–23)
CALCIUM SERPL-MCNC: 9.2 MG/DL (ref 8.7–10.5)
CHLORIDE SERPL-SCNC: 107 MMOL/L (ref 95–110)
CHOLEST SERPL-MCNC: 208 MG/DL (ref 120–199)
CHOLEST/HDLC SERPL: 5.5 {RATIO} (ref 2–5)
CO2 SERPL-SCNC: 25 MMOL/L (ref 23–29)
CREAT SERPL-MCNC: 0.9 MG/DL (ref 0.5–1.4)
ERYTHROCYTE [DISTWIDTH] IN BLOOD BY AUTOMATED COUNT: 12.6 % (ref 11.5–14.5)
EST. GFR  (AFRICAN AMERICAN): >60 ML/MIN/1.73 M^2
EST. GFR  (NON AFRICAN AMERICAN): >60 ML/MIN/1.73 M^2
ESTIMATED AVG GLUCOSE: 151 MG/DL (ref 68–131)
GLUCOSE SERPL-MCNC: 150 MG/DL (ref 70–110)
HBA1C MFR BLD HPLC: 6.9 % (ref 4–5.6)
HCT VFR BLD AUTO: 36.5 % (ref 37–48.5)
HDLC SERPL-MCNC: 38 MG/DL (ref 40–75)
HDLC SERPL: 18.3 % (ref 20–50)
HGB BLD-MCNC: 11.5 G/DL (ref 12–16)
LDLC SERPL CALC-MCNC: 104.8 MG/DL (ref 63–159)
MCH RBC QN AUTO: 28.8 PG (ref 27–31)
MCHC RBC AUTO-ENTMCNC: 31.5 G/DL (ref 32–36)
MCV RBC AUTO: 91 FL (ref 82–98)
NONHDLC SERPL-MCNC: 170 MG/DL
PLATELET # BLD AUTO: 253 K/UL (ref 150–350)
PMV BLD AUTO: 10.7 FL (ref 9.2–12.9)
POTASSIUM SERPL-SCNC: 4.5 MMOL/L (ref 3.5–5.1)
PROT SERPL-MCNC: 6.7 G/DL (ref 6–8.4)
RBC # BLD AUTO: 4 M/UL (ref 4–5.4)
SODIUM SERPL-SCNC: 140 MMOL/L (ref 136–145)
TRIGL SERPL-MCNC: 326 MG/DL (ref 30–150)
WBC # BLD AUTO: 7.55 K/UL (ref 3.9–12.7)

## 2019-03-26 PROCEDURE — 36415 COLL VENOUS BLD VENIPUNCTURE: CPT | Mod: PO

## 2019-03-26 PROCEDURE — 83036 HEMOGLOBIN GLYCOSYLATED A1C: CPT

## 2019-03-26 PROCEDURE — 80053 COMPREHEN METABOLIC PANEL: CPT

## 2019-03-26 PROCEDURE — 85027 COMPLETE CBC AUTOMATED: CPT

## 2019-03-26 PROCEDURE — 80061 LIPID PANEL: CPT

## 2019-03-27 NOTE — TELEPHONE ENCOUNTER
----- Message from Anjali Lees sent at 3/27/2019  4:16 PM CDT -----  Type:  RX Refill Request    Who Called:  pt  Refill RX Name and Strength: cyclobenzaprine 10  mg  How is the patient currently taking it? (ex. 1XDay):1  Tab  Every  6  hr  Is this a 30 day or 90 day RX:90  day  Preferred Pharmacy with phone number:   [facility] [id number]  [address] [facility]  [address] [facility] [address]  [facility]  [place] [address]  Phone: [phone] Fax: [phone]  Local Best Call Back Number:  [phone]  Additional Information:   Calling to  Get a  refill

## 2019-03-28 RX ORDER — CYCLOBENZAPRINE HCL 10 MG
TABLET ORAL
Qty: 90 TABLET | Refills: 5 | Status: SHIPPED | OUTPATIENT
Start: 2019-03-28 | End: 2019-05-02 | Stop reason: SDUPTHER

## 2019-03-28 RX ORDER — BUTALBITAL, ACETAMINOPHEN AND CAFFEINE 50; 325; 40 MG/1; MG/1; MG/1
TABLET ORAL
Qty: 120 TABLET | Refills: 0 | Status: SHIPPED | OUTPATIENT
Start: 2019-03-28 | End: 2019-05-28 | Stop reason: SDUPTHER

## 2019-04-01 ENCOUNTER — CLINICAL SUPPORT (OUTPATIENT)
Dept: CARDIOLOGY | Facility: CLINIC | Age: 69
End: 2019-04-01
Attending: INTERNAL MEDICINE
Payer: MEDICARE

## 2019-04-01 ENCOUNTER — DOCUMENTATION ONLY (OUTPATIENT)
Dept: FAMILY MEDICINE | Facility: CLINIC | Age: 69
End: 2019-04-01

## 2019-04-01 DIAGNOSIS — I44.2 AV BLOCK, COMPLETE: ICD-10-CM

## 2019-04-01 DIAGNOSIS — Z95.810 AUTOMATIC IMPLANTABLE CARDIOVERTER-DEFIBRILLATOR IN SITU: ICD-10-CM

## 2019-04-01 NOTE — PROGRESS NOTES
PA initiated for Cyclobenzaprine 10mg tablets.  SHALONDA: [id number]  MedIact  ----------------------------------------------------  4/4/19: Spoke to Bryce at [facility].. States still pending but should have a decision soon.  ---------------------------------------------------------  APPROVED  Valid 4/1/19-3/31/2020

## 2019-04-04 ENCOUNTER — DOCUMENTATION ONLY (OUTPATIENT)
Dept: FAMILY MEDICINE | Facility: CLINIC | Age: 69
End: 2019-04-04

## 2019-04-04 NOTE — PROGRESS NOTES
PA initiated for Rabeprazole 20mg tablets.  SHALONDA: QRHT  MedImpact  ---------------------------------------------------------  APPROVED  Valid: 4/1/18-3/31/2020

## 2019-04-08 DIAGNOSIS — I47.20 VENTRICULAR TACHYARRHYTHMIA: ICD-10-CM

## 2019-04-08 DIAGNOSIS — Z95.810 AUTOMATIC IMPLANTABLE CARDIOVERTER-DEFIBRILLATOR IN SITU: Primary | ICD-10-CM

## 2019-04-18 ENCOUNTER — PATIENT OUTREACH (OUTPATIENT)
Dept: ADMINISTRATIVE | Facility: HOSPITAL | Age: 69
End: 2019-04-18

## 2019-04-18 NOTE — LETTER
April 26, 2019    Inge Monge  [address] [place] [address]             [facility]  [address]  [facility] [address]  Phone: [phone] Dear Mrs. Monge:    [facility] is committed to your overall health.  To help you get the most out of each of your visits, we will review your information to make sure you are up to date on all of your recommended tests and/or procedures.       Roverto Davis MD   has found that your chart shows you may be due for the following:   Colonoscopy   Influenza Vaccine   Foot Exam   Mammogram   DEXA SCAN   Eye Exam     Future Appointments   5/2/2019   10:00 AM   Roverto Davis MD     [facility] MED   [place]   6/4/2019   8:30 AM    Rony Sneed MD  [facility] CARDIO    [place]     If you have had any of the above done at another facility, please bring the records with you or fax them to [phone] so that your record at [facility] will be complete. If you have not had any of these tests or procedures done recently and would like to complete this testing ,  please call [phone] or send a message through your [facility] portal to your provider's office.     If you have an upcoming scheduled appointment for the above test and/or procedures, please disregard this letter.        If you have any questions or concerns, please don't hesitate to call.    Sincerely,  Annie Lindsey MA  [phone]

## 2019-04-18 NOTE — PROGRESS NOTES
Health Maintenance Due   Topic Date Due    Colonoscopy  01/11/2015    Influenza Vaccine  08/01/2018    Foot Exam  03/01/2019    Mammogram  03/12/2019    DEXA SCAN  03/14/2019    Eye Exam  03/21/2019

## 2019-04-30 ENCOUNTER — CLINICAL SUPPORT (OUTPATIENT)
Dept: CARDIOLOGY | Facility: CLINIC | Age: 69
End: 2019-04-30
Attending: INTERNAL MEDICINE
Payer: MEDICARE

## 2019-04-30 DIAGNOSIS — Z95.810 AUTOMATIC IMPLANTABLE CARDIOVERTER-DEFIBRILLATOR IN SITU: ICD-10-CM

## 2019-04-30 DIAGNOSIS — I47.20 VENTRICULAR TACHYARRHYTHMIA: ICD-10-CM

## 2019-05-02 ENCOUNTER — OFFICE VISIT (OUTPATIENT)
Dept: FAMILY MEDICINE | Facility: CLINIC | Age: 69
End: 2019-05-02
Payer: MEDICARE

## 2019-05-02 VITALS
HEART RATE: 70 BPM | SYSTOLIC BLOOD PRESSURE: 136 MMHG | HEIGHT: 66 IN | OXYGEN SATURATION: 95 % | WEIGHT: 156.94 LBS | TEMPERATURE: 98 F | DIASTOLIC BLOOD PRESSURE: 84 MMHG | BODY MASS INDEX: 25.22 KG/M2

## 2019-05-02 DIAGNOSIS — E78.5 HYPERLIPIDEMIA, UNSPECIFIED HYPERLIPIDEMIA TYPE: ICD-10-CM

## 2019-05-02 DIAGNOSIS — Z12.39 BREAST CANCER SCREENING: ICD-10-CM

## 2019-05-02 DIAGNOSIS — I25.10 CORONARY ARTERY DISEASE INVOLVING NATIVE CORONARY ARTERY OF NATIVE HEART WITHOUT ANGINA PECTORIS: ICD-10-CM

## 2019-05-02 DIAGNOSIS — J18.9 PNEUMONIA OF RIGHT LOWER LOBE DUE TO INFECTIOUS ORGANISM: ICD-10-CM

## 2019-05-02 DIAGNOSIS — E11.9 TYPE 2 DIABETES MELLITUS WITHOUT COMPLICATION, WITHOUT LONG-TERM CURRENT USE OF INSULIN: Primary | ICD-10-CM

## 2019-05-02 DIAGNOSIS — J44.9 CHRONIC OBSTRUCTIVE PULMONARY DISEASE, UNSPECIFIED COPD TYPE: ICD-10-CM

## 2019-05-02 DIAGNOSIS — I10 ESSENTIAL HYPERTENSION: ICD-10-CM

## 2019-05-02 DIAGNOSIS — G31.84 MCI (MILD COGNITIVE IMPAIRMENT): ICD-10-CM

## 2019-05-02 PROBLEM — E83.42 HYPOMAGNESEMIA: Status: RESOLVED | Noted: 2019-03-19 | Resolved: 2019-05-02

## 2019-05-02 PROBLEM — E87.6 HYPOKALEMIA, GASTROINTESTINAL LOSSES: Status: RESOLVED | Noted: 2019-03-19 | Resolved: 2019-05-02

## 2019-05-02 PROCEDURE — 99214 OFFICE O/P EST MOD 30 MIN: CPT | Mod: S$PBB,,, | Performed by: FAMILY MEDICINE

## 2019-05-02 PROCEDURE — 99214 PR OFFICE/OUTPT VISIT, EST, LEVL IV, 30-39 MIN: ICD-10-PCS | Mod: S$PBB,,, | Performed by: FAMILY MEDICINE

## 2019-05-02 PROCEDURE — 99999 PR PBB SHADOW E&M-EST. PATIENT-LVL V: CPT | Mod: PBBFAC,,, | Performed by: FAMILY MEDICINE

## 2019-05-02 PROCEDURE — 99215 OFFICE O/P EST HI 40 MIN: CPT | Mod: PBBFAC,PO | Performed by: FAMILY MEDICINE

## 2019-05-02 PROCEDURE — 99999 PR PBB SHADOW E&M-EST. PATIENT-LVL V: ICD-10-PCS | Mod: PBBFAC,,, | Performed by: FAMILY MEDICINE

## 2019-05-02 RX ORDER — METOPROLOL SUCCINATE 200 MG/1
TABLET, EXTENDED RELEASE ORAL
Qty: 180 TABLET | Refills: 3 | Status: SHIPPED | OUTPATIENT
Start: 2019-05-02

## 2019-05-02 RX ORDER — RABEPRAZOLE SODIUM 20 MG/1
20 TABLET, DELAYED RELEASE ORAL DAILY
Qty: 90 TABLET | Refills: 3 | Status: SHIPPED | OUTPATIENT
Start: 2019-05-02 | End: 2020-01-29 | Stop reason: SDUPTHER

## 2019-05-02 RX ORDER — METFORMIN HYDROCHLORIDE 750 MG/1
750 TABLET, EXTENDED RELEASE ORAL NIGHTLY
Qty: 90 TABLET | Refills: 3 | Status: SHIPPED | OUTPATIENT
Start: 2019-05-02

## 2019-05-02 RX ORDER — TIOTROPIUM BROMIDE 18 UG/1
1 CAPSULE ORAL; RESPIRATORY (INHALATION) DAILY
Qty: 30 CAPSULE | Refills: 11 | Status: SHIPPED | OUTPATIENT
Start: 2019-05-02 | End: 2020-05-04

## 2019-05-02 RX ORDER — ALBUTEROL SULFATE 0.83 MG/ML
2.5 SOLUTION RESPIRATORY (INHALATION) EVERY 6 HOURS PRN
Qty: 75 ML | Refills: 2 | Status: SHIPPED | OUTPATIENT
Start: 2019-05-02 | End: 2020-05-04

## 2019-05-02 RX ORDER — VERAPAMIL HYDROCHLORIDE 240 MG/1
TABLET, FILM COATED, EXTENDED RELEASE ORAL
Qty: 180 TABLET | Refills: 3 | Status: SHIPPED | OUTPATIENT
Start: 2019-05-02

## 2019-05-02 RX ORDER — FENOFIBRIC ACID 135 MG/1
1 CAPSULE, DELAYED RELEASE ORAL DAILY
Qty: 90 CAPSULE | Refills: 3 | Status: SHIPPED | OUTPATIENT
Start: 2019-05-02 | End: 2020-10-29 | Stop reason: SDUPTHER

## 2019-05-02 RX ORDER — PRAVASTATIN SODIUM 20 MG/1
TABLET ORAL
Qty: 90 TABLET | Refills: 3 | Status: SHIPPED | OUTPATIENT
Start: 2019-05-02

## 2019-05-02 RX ORDER — RAMIPRIL 10 MG/1
CAPSULE ORAL
Qty: 90 CAPSULE | Refills: 3 | Status: SHIPPED | OUTPATIENT
Start: 2019-05-02 | End: 2020-07-31

## 2019-05-02 RX ORDER — CYCLOBENZAPRINE HCL 10 MG
TABLET ORAL
Qty: 90 TABLET | Refills: 5 | Status: SHIPPED | OUTPATIENT
Start: 2019-05-02

## 2019-05-02 RX ORDER — FLUTICASONE FUROATE AND VILANTEROL 200; 25 UG/1; UG/1
1 POWDER RESPIRATORY (INHALATION) DAILY
Qty: 30 EACH | Refills: 11 | Status: SHIPPED | OUTPATIENT
Start: 2019-05-02

## 2019-05-02 RX ORDER — DONEPEZIL HYDROCHLORIDE 5 MG/1
5 TABLET, FILM COATED ORAL NIGHTLY
Qty: 30 TABLET | Refills: 11 | Status: SHIPPED | OUTPATIENT
Start: 2019-05-02 | End: 2019-09-04

## 2019-05-02 NOTE — PROGRESS NOTES
Chief Complaint   Patient presents with    Annual Exam       HISTORY OF PRESENT ILLNESS:     Here for 6 month f/u on chronic health issues    C/o some soreness in bilateral upper back muscles.    Here today with son. He reports increased issues with short-term memory.  Not managing finances.  This seems to have progressed over the last 2 years.    Admitted in March to [facility]    Admission Date: 3/19/2019  Hospital Length of Stay: 2 days  Discharge Date and Time: 3/22/2019  2:38 PM  Attending Physician: No att. providers found   Discharging Provider: Brianda Pineda MD  Primary Care Provider: Roverto Davis MD  HPI:   Patient is a 69-year-old lady with COPD, CAD has had 4 stents 2006, HOCM post alcohol ablation, has had AICD for SVT/VT -and DM type 2, was seen as outpatient for cough and chest congestion week ago was started on doxycycline, after doing chest x-ray with suggested right lower lobe pneumonitis.  Patient of lobe diarrhea with no abdominal pain about 4 watery explosive bowel movements a day over the last 5 days, continues to have low-grade fevers and weakness.  Lab workup of as outpatient showed low potassium,-for lab workup and chest x-ray patient sent to [facility] ER for hypokalemia -EKG shows right bundle branch block paced rhythm. Repeat lab workup on arrival to ER showed  potassium 2.3 -magnesium of 1.1.  Vital signs otherwise stable afebrile.  Hospital Medicine as to evaluate and admit patient for severe hypokalemia with GI electrolyte loss.  History obtained from reviewing records and talking to patient.  No previous history of C diff, complains of greenish yellow sputum on an of, no worsening respiratory symptoms.  Denies chest pains, leg edema, orthopnea.  Has chronic dyspnea with minimal activity follows pulmonology at [facility] -up-to-date with vaccinations.  Being admitted as observation for electrolyte imbalance.  With  antibiotics use for pneumonia.  Hospital Course:   Chronic diarrhea with  "supportive care and electrolyte replacement. History of c difficile and hence placed on preventive tx while on antibiotics.  CT chest with severe affect seem a no overt pneumonia.  CT abdomen pelvis with no acute abnormalities.  Her diarrhea worsened while she was empirically given antibiotic regimen at [place].  No need for further antibiotics.  Discontinue preventive vancomycin has we are stopping antibiotics.  Replacement potassium and magnesium.  Able to tolerate diet and was feeling at baseline at discharge    DM2 - doing well on present regimen of janumet twice a day and metformin 750mg QHS; BG's 100; lost 14 pounds since last appt  HLD - tolerating Trilipix and Pravastatin 20mg daily  HTN - tolerating Ramipril 10mg daily , metoprolol and Verapamil; using lasix as needed but rarely  CAD - following with Dr. Masterson  COPD - stable on Breo and Spiriva; using albuterol   GERD - taking Aciphex daily    MINI-MENTAL STATE EXAM    What is the (year), (season), (date), (day), (month)?5 points  Where are we (state), ([place]), (town), (hospital), (floor)? 5 points  Name 3 objects: 1 second to say each, then ask the patient to repeat all three after you have said them.  Repeat initially until he/she learns all three. 3 points  Serial 7's. 1 point for each answer, stop after 5.  Alternatively, spell "world" backwards.  Must be in the correct sequence.  5 points  Show 2 common objects (pencil and watch).  Ask patient to name. 2 points  Ask the patient to repeat No ifs, ands or buts. 1 point  Follow a 3 stage command: "Take this paper in your right hand, fold it in half, and put it on the floor". 3 points  Read and obey: "Close your eyes". 1 poinr  Ask the patient to recall the three words you previously asked. 1 points  Give pt. paper and ask to write a sentence. 1 point  Show pt. drawing of intersecting pentagons. Ask pt. to draw. 1 point  What was the mini mental exam score? 28 /30  Level of consciousness: alert  Describe " "if abnormal:   Fund of knowledge: Normal  General appearance: Normal  Clock face 100% accurate    PAST MEDICAL HISTORY:   Type 2 diabetes, coronary artery disease, COPD,   hypertension, mitral valve regurgitation, hypertrophic cardiomyopathy s/p dual chamber,   hypertriglyceridemia, peptic ulcer disease, osteopenia, elevated CPK (worse with statins)sz and colon polyps.     REVIEW OF SYSTEMS: She denies any chest pain, shortness of breath,   dizziness, palpitations, and lower extremity swelling.   Occ sputum production    PHYSICAL EXAM:   /84   Pulse 70   Temp 97.6 °F (36.4 °C) (Oral)   Ht 5' 6" (1.676 m)   Wt 71.2 kg (156 lb 15.5 oz)   SpO2 95%   BMI 25.34 kg/m²   GENERAL: A healthy-appearing 69-year-old white female, who sitting   upright, in no apparent distress, alert and oriented x4.   Posterior oropharynx with some PND. TMs clear  NECK: Supple. There is no lymphadenopathy, thyromegaly or JVD.   CHEST: Clear to auscultation bilaterally with good respiratory movement.   CARDIOVASCULAR: S1 and S2. Regular rate and rhythm. No murmurs, rubs or gallops.   ABDOMEN: Benign.   EXTREMITIES: Show no cyanosis, clubbing or edema.     Results for orders placed or performed in visit on 03/29/19   CBC auto differential   Result Value Ref Range    WBC 8.16 3.90 - 12.70 K/uL    RBC 4.18 4.00 - 5.40 M/uL    Hemoglobin 12.3 12.0 - 16.0 g/dL    Hematocrit 38.2 37.0 - 48.5 %    Mean Corpuscular Volume 91 82 - 98 fL    Mean Corpuscular Hemoglobin 29.4 27.0 - 31.0 pg    Mean Corpuscular Hemoglobin Conc 32.2 32.0 - 36.0 g/dL    RDW 13.0 11.5 - 14.5 %    Platelets 259 150 - 350 K/uL    MPV 10.2 9.2 - 12.9 fL    Immature Granulocytes 0.6 (H) 0.0 - 0.5 %    Gran # (ANC) 5.2 1.8 - 7.7 K/uL    Immature Grans (Abs) 0.05 (H) 0.00 - 0.04 K/uL    Lymph # 2.1 1.0 - 4.8 K/uL    Mono # 0.6 0.3 - 1.0 K/uL    Eos # 0.1 0.0 - 0.5 K/uL    Baso # 0.08 0.00 - 0.20 K/uL    nRBC 0 0 /100 WBC    Gran% 64.2 38.0 - 73.0 %    Lymph% 25.4 18.0 - " 48.0 %    Mono% 7.8 4.0 - 15.0 %    Eosinophil% 1.0 0.0 - 8.0 %    Basophil% 1.0 0.0 - 1.9 %    Differential Method Automated    Basic metabolic panel   Result Value Ref Range    Sodium 139 136 - 145 mmol/L    Potassium 4.9 3.5 - 5.1 mmol/L    Chloride 106 95 - 110 mmol/L    CO2 24 22 - 31 mmol/L    Glucose 100 70 - 110 mg/dL    BUN, Bld 13 7 - 18 mg/dL    Creatinine 0.78 0.50 - 1.40 mg/dL    Calcium 9.2 8.4 - 10.2 mg/dL    Anion Gap 9 8 - 16 mmol/L    eGFR if African American >60 >60 mL/min/1.73 m^2    eGFR if non African American >60 >60 mL/min/1.73 m^2   Vitamin D   Result Value Ref Range    Vit D, 25-Hydroxy 17 (L) 30 - 96 ng/mL   TSH   Result Value Ref Range    TSH 0.704 0.400 - 4.000 uIU/mL   T4, free   Result Value Ref Range    Free T4 1.15 0.78 - 2.19 ng/dL   Magnesium   Result Value Ref Range    Magnesium 2.0 1.6 - 2.6 mg/dL     March labs reviewed  hospital records reviewed    Type 2 diabetes mellitus without complication, without long-term current use of insulin  -     Hemoglobin A1c; Future; Expected date: 07/31/2019  -     Comprehensive metabolic panel; Future; Expected date: 07/31/2019  -     Ambulatory referral to Optometry    Essential hypertension    Coronary artery disease involving native coronary artery of native heart without angina pectoris    Chronic obstructive pulmonary disease, unspecified COPD type  -     Ambulatory referral to Pulmonology    Hyperlipidemia, unspecified hyperlipidemia type    MCI (mild cognitive impairment)    Pneumonia of right lower lobe due to infectious organism  -     albuterol (PROVENTIL) 2.5 mg /3 mL (0.083 %) nebulizer solution; Take 3 mLs (2.5 mg total) by nebulization every 6 (six) hours as needed for Wheezing. Rescue  Dispense: 75 mL; Refill: 2    Breast cancer screening  -     Mammo Digital Screening Bilat; Future; Expected date: 05/02/2019    Other orders  -     donepezil (ARICEPT) 5 MG tablet; Take 1 tablet (5 mg total) by mouth every evening.  Dispense: 30  tablet; Refill: 11  -     cyclobenzaprine (FLEXERIL) 10 MG tablet; TAKE 1 TABLET(10 MG) BY MOUTH EVERY 8 HOURS  Dispense: 90 tablet; Refill: 5  -     fenofibric acid (FIBRICOR) 135 mg CpDR; Take 1 capsule (135 mg total) by mouth once daily.  Dispense: 90 capsule; Refill: 3  -     fluticasone furoate-vilanterol (BREO) 200-25 mcg/dose DsDv diskus inhaler; Inhale 1 puff into the lungs once daily. Controller  Dispense: 30 each; Refill: 11  -     metFORMIN (GLUCOPHAGE-XR) 750 MG 24 hr tablet; Take 1 tablet (750 mg total) by mouth every evening.  Dispense: 90 tablet; Refill: 3  -     metoprolol succinate (TOPROL-XL) 200 MG 24 hr tablet; TAKE 1 TABLET(200 MG) BY MOUTH TWICE DAILY  Dispense: 180 tablet; Refill: 3  -     pravastatin (PRAVACHOL) 20 MG tablet; TAKE 1 TABLET(20 MG) BY MOUTH EVERY EVENING  Dispense: 90 tablet; Refill: 3  -     RABEprazole (ACIPHEX) 20 mg tablet; Take 1 tablet (20 mg total) by mouth once daily.  Dispense: 90 tablet; Refill: 3  -     ramipril (ALTACE) 10 MG capsule; TAKE 1 CAPSULE(10 MG) BY MOUTH EVERY DAY  Dispense: 90 capsule; Refill: 3  -     SITagliptan-metformin (JANUMET)  mg per tablet; Take 1 tablet by mouth 2 (two) times daily.  Dispense: 180 tablet; Refill: 3  -     tiotropium (SPIRIVA) 18 mcg inhalation capsule; Inhale 1 capsule (18 mcg total) into the lungs once daily. Controller  Dispense: 30 capsule; Refill: 11  -     verapamil (CALAN-SR) 240 MG CR tablet; TAKE 1 TABLET(240 MG) BY MOUTH TWICE DAILY  Dispense: 180 tablet; Refill: 3        Trial of aricept for MCI  F/u with pulmonology and cardiology as planned  continue Trilipix,  pravastatin 20mg daily; Krill fish oil 1,000mg daily  Continue present meds  continue diabetic diet efforts and present meds  1 hour spent with patient  F/u 3 months with labs.

## 2019-05-15 ENCOUNTER — DOCUMENTATION ONLY (OUTPATIENT)
Dept: FAMILY MEDICINE | Facility: CLINIC | Age: 69
End: 2019-05-15

## 2019-05-15 NOTE — PROGRESS NOTES
PA initiated for Fenofibric 135mg DR capsules  CMM: [id number]  MedImpact  ---------------------------------------------------------  APPROVED  Valid: 5/15/19-5/14/2020

## 2019-05-29 RX ORDER — CYCLOBENZAPRINE HCL 10 MG
TABLET ORAL
Qty: 90 TABLET | Refills: 0 | Status: SHIPPED | OUTPATIENT
Start: 2019-05-29 | End: 2019-07-11 | Stop reason: SDUPTHER

## 2019-05-29 RX ORDER — BUTALBITAL, ACETAMINOPHEN AND CAFFEINE 50; 325; 40 MG/1; MG/1; MG/1
TABLET ORAL
Qty: 120 TABLET | Refills: 0 | Status: SHIPPED | OUTPATIENT
Start: 2019-05-29 | End: 2019-08-13 | Stop reason: SDUPTHER

## 2019-05-30 ENCOUNTER — DOCUMENTATION ONLY (OUTPATIENT)
Dept: CARDIOLOGY | Facility: CLINIC | Age: 69
End: 2019-05-30

## 2019-05-30 ENCOUNTER — TELEPHONE (OUTPATIENT)
Dept: CARDIOLOGY | Facility: CLINIC | Age: 69
End: 2019-05-30

## 2019-05-30 DIAGNOSIS — I47.20 VENTRICULAR TACHYCARDIA: Primary | ICD-10-CM

## 2019-05-30 NOTE — PROGRESS NOTES
Rec'd notification from [employer] home monitor for a VT episode (monitor zone) listed as 13 seconds, likely longer, unable to see onset.  Dr. Sneed notified.  New orders rec'd for BMP, CBC, and Mag levels.  Orders entered.  Attempted to contact pt, primary number is for the pt's sister who is her caregiver.  She was notified of the events, the orders for lab work, and that the physician would like these drawn prior to her appt with him on 6/11/19.  She verbalized understanding and stated she will bring her in for labs to be drawn.

## 2019-05-30 NOTE — TELEPHONE ENCOUNTER
----- Message from Martha Lane sent at 5/30/2019  3:47 PM CDT -----  Contact: patient  Type:  Patient Returning Call    Who Called:  patient  Who Left Message for Patient:    Does the patient know what this is regarding?:    Best Call Back Number:  [phone]  Additional Information:

## 2019-05-31 ENCOUNTER — CLINICAL SUPPORT (OUTPATIENT)
Dept: CARDIOLOGY | Facility: CLINIC | Age: 69
End: 2019-05-31
Attending: INTERNAL MEDICINE
Payer: MEDICARE

## 2019-05-31 DIAGNOSIS — I47.20 VENTRICULAR TACHYARRHYTHMIA: ICD-10-CM

## 2019-05-31 DIAGNOSIS — Z95.810 AUTOMATIC IMPLANTABLE CARDIOVERTER-DEFIBRILLATOR IN SITU: ICD-10-CM

## 2019-06-10 ENCOUNTER — LAB VISIT (OUTPATIENT)
Dept: LAB | Facility: HOSPITAL | Age: 69
End: 2019-06-10
Attending: INTERNAL MEDICINE
Payer: MEDICARE

## 2019-06-10 DIAGNOSIS — I47.20 VENTRICULAR TACHYCARDIA: ICD-10-CM

## 2019-06-10 LAB
ERYTHROCYTE [DISTWIDTH] IN BLOOD BY AUTOMATED COUNT: 13.5 % (ref 11.5–14.5)
HCT VFR BLD AUTO: 38 % (ref 37–48.5)
HGB BLD-MCNC: 11.9 G/DL (ref 12–16)
MCH RBC QN AUTO: 29.5 PG (ref 27–31)
MCHC RBC AUTO-ENTMCNC: 31.3 G/DL (ref 32–36)
MCV RBC AUTO: 94 FL (ref 82–98)
PLATELET # BLD AUTO: 200 K/UL (ref 150–350)
PMV BLD AUTO: 10 FL (ref 9.2–12.9)
RBC # BLD AUTO: 4.03 M/UL (ref 4–5.4)
WBC # BLD AUTO: 6.56 K/UL (ref 3.9–12.7)

## 2019-06-10 PROCEDURE — 83735 ASSAY OF MAGNESIUM: CPT

## 2019-06-10 PROCEDURE — 80048 BASIC METABOLIC PNL TOTAL CA: CPT

## 2019-06-10 PROCEDURE — 85027 COMPLETE CBC AUTOMATED: CPT

## 2019-06-10 PROCEDURE — 36415 COLL VENOUS BLD VENIPUNCTURE: CPT | Mod: PO

## 2019-06-11 ENCOUNTER — TELEPHONE (OUTPATIENT)
Dept: CARDIOLOGY | Facility: CLINIC | Age: 69
End: 2019-06-11

## 2019-06-11 ENCOUNTER — PATIENT MESSAGE (OUTPATIENT)
Dept: CARDIOLOGY | Facility: CLINIC | Age: 69
End: 2019-06-11

## 2019-06-11 LAB
ANION GAP SERPL CALC-SCNC: 10 MMOL/L (ref 8–16)
BUN SERPL-MCNC: 13 MG/DL (ref 8–23)
CALCIUM SERPL-MCNC: 9.2 MG/DL (ref 8.7–10.5)
CHLORIDE SERPL-SCNC: 109 MMOL/L (ref 95–110)
CO2 SERPL-SCNC: 24 MMOL/L (ref 23–29)
CREAT SERPL-MCNC: 1 MG/DL (ref 0.5–1.4)
EST. GFR  (AFRICAN AMERICAN): >60 ML/MIN/1.73 M^2
EST. GFR  (NON AFRICAN AMERICAN): 57.6 ML/MIN/1.73 M^2
GLUCOSE SERPL-MCNC: 112 MG/DL (ref 70–110)
MAGNESIUM SERPL-MCNC: 1.8 MG/DL (ref 1.6–2.6)
POTASSIUM SERPL-SCNC: 4.5 MMOL/L (ref 3.5–5.1)
SODIUM SERPL-SCNC: 143 MMOL/L (ref 136–145)

## 2019-06-11 NOTE — TELEPHONE ENCOUNTER
----- Message from Alfonzo Mesa sent at 6/11/2019  9:06 AM CDT -----  Contact: self   Type:  Sooner Apoointment Request    Caller is requesting a sooner appointment.  Caller declined first available appointment listed below.  Caller will not accept being placed on the waitlist and is requesting a message be sent to doctor.    Name of Caller: patient   When is the first available appointment?  7/19  Symptoms: 6 months f/u    Best Call Back Number:  [phone]

## 2019-06-20 ENCOUNTER — TELEPHONE (OUTPATIENT)
Dept: FAMILY MEDICINE | Facility: CLINIC | Age: 69
End: 2019-06-20

## 2019-06-20 NOTE — TELEPHONE ENCOUNTER
----- Message from Sharon Jones sent at 6/20/2019  1:52 PM CDT -----  Contact: Ines with [facility]  Type:  Pharmacy Calling to Clarify an RX    Name of Caller:  Ines  Pharmacy Name:  [facility]  Prescription Name:  albuterol (PROVENTIL) 2.5 mg /3 mL (0.083 %) nebulizer solution  What do they need to clarify?:  ICD 10 Code  Best Call Back Number:  [phone] ext [phone]

## 2019-07-11 ENCOUNTER — OFFICE VISIT (OUTPATIENT)
Dept: FAMILY MEDICINE | Facility: CLINIC | Age: 69
End: 2019-07-11
Payer: MEDICARE

## 2019-07-11 ENCOUNTER — LAB VISIT (OUTPATIENT)
Dept: LAB | Facility: HOSPITAL | Age: 69
End: 2019-07-11
Attending: FAMILY MEDICINE
Payer: MEDICARE

## 2019-07-11 VITALS
WEIGHT: 159.63 LBS | HEIGHT: 66 IN | RESPIRATION RATE: 22 BRPM | DIASTOLIC BLOOD PRESSURE: 76 MMHG | TEMPERATURE: 98 F | SYSTOLIC BLOOD PRESSURE: 118 MMHG | OXYGEN SATURATION: 95 % | HEART RATE: 70 BPM | BODY MASS INDEX: 25.66 KG/M2

## 2019-07-11 DIAGNOSIS — E11.9 TYPE 2 DIABETES MELLITUS WITHOUT COMPLICATION, WITHOUT LONG-TERM CURRENT USE OF INSULIN: ICD-10-CM

## 2019-07-11 DIAGNOSIS — R53.83 FATIGUE, UNSPECIFIED TYPE: Primary | ICD-10-CM

## 2019-07-11 DIAGNOSIS — R53.83 FATIGUE, UNSPECIFIED TYPE: ICD-10-CM

## 2019-07-11 DIAGNOSIS — I10 ESSENTIAL HYPERTENSION: ICD-10-CM

## 2019-07-11 DIAGNOSIS — J44.9 CHRONIC OBSTRUCTIVE PULMONARY DISEASE, UNSPECIFIED COPD TYPE: ICD-10-CM

## 2019-07-11 DIAGNOSIS — E78.5 HYPERLIPIDEMIA, UNSPECIFIED HYPERLIPIDEMIA TYPE: ICD-10-CM

## 2019-07-11 DIAGNOSIS — I25.10 CORONARY ARTERY DISEASE INVOLVING NATIVE CORONARY ARTERY OF NATIVE HEART WITHOUT ANGINA PECTORIS: ICD-10-CM

## 2019-07-11 LAB
ALBUMIN SERPL BCP-MCNC: 4.4 G/DL (ref 3.5–5.2)
ALP SERPL-CCNC: 40 U/L (ref 55–135)
ALT SERPL W/O P-5'-P-CCNC: 25 U/L (ref 10–44)
ANION GAP SERPL CALC-SCNC: 12 MMOL/L (ref 8–16)
AST SERPL-CCNC: 24 U/L (ref 10–40)
BASOPHILS # BLD AUTO: 0.04 K/UL (ref 0–0.2)
BASOPHILS NFR BLD: 0.6 % (ref 0–1.9)
BILIRUB SERPL-MCNC: 0.2 MG/DL (ref 0.1–1)
BUN SERPL-MCNC: 16 MG/DL (ref 8–23)
CALCIUM SERPL-MCNC: 9.4 MG/DL (ref 8.7–10.5)
CHLORIDE SERPL-SCNC: 106 MMOL/L (ref 95–110)
CO2 SERPL-SCNC: 23 MMOL/L (ref 23–29)
CREAT SERPL-MCNC: 1.1 MG/DL (ref 0.5–1.4)
DIFFERENTIAL METHOD: NORMAL
EOSINOPHIL # BLD AUTO: 0.1 K/UL (ref 0–0.5)
EOSINOPHIL NFR BLD: 1.4 % (ref 0–8)
ERYTHROCYTE [DISTWIDTH] IN BLOOD BY AUTOMATED COUNT: 13.1 % (ref 11.5–14.5)
EST. GFR  (AFRICAN AMERICAN): 59 ML/MIN/1.73 M^2
EST. GFR  (NON AFRICAN AMERICAN): 51 ML/MIN/1.73 M^2
GLUCOSE SERPL-MCNC: 120 MG/DL (ref 70–110)
HCT VFR BLD AUTO: 38.1 % (ref 37–48.5)
HGB BLD-MCNC: 12.8 G/DL (ref 12–16)
LYMPHOCYTES # BLD AUTO: 2.2 K/UL (ref 1–4.8)
LYMPHOCYTES NFR BLD: 29.6 % (ref 18–48)
MCH RBC QN AUTO: 30.5 PG (ref 27–31)
MCHC RBC AUTO-ENTMCNC: 33.6 G/DL (ref 32–36)
MCV RBC AUTO: 91 FL (ref 82–98)
MONOCYTES # BLD AUTO: 0.7 K/UL (ref 0.3–1)
MONOCYTES NFR BLD: 9.4 % (ref 4–15)
NEUTROPHILS # BLD AUTO: 4.3 K/UL (ref 1.8–7.7)
NEUTROPHILS NFR BLD: 59 % (ref 38–73)
PLATELET # BLD AUTO: 221 K/UL (ref 150–350)
PMV BLD AUTO: 9.7 FL (ref 9.2–12.9)
POTASSIUM SERPL-SCNC: 4.3 MMOL/L (ref 3.5–5.1)
PROT SERPL-MCNC: 7.4 G/DL (ref 6–8.4)
RBC # BLD AUTO: 4.2 M/UL (ref 4–5.4)
SODIUM SERPL-SCNC: 141 MMOL/L (ref 136–145)
WBC # BLD AUTO: 7.26 K/UL (ref 3.9–12.7)

## 2019-07-11 PROCEDURE — 84439 ASSAY OF FREE THYROXINE: CPT

## 2019-07-11 PROCEDURE — 80053 COMPREHEN METABOLIC PANEL: CPT | Mod: PO

## 2019-07-11 PROCEDURE — 93010 ELECTROCARDIOGRAM REPORT: CPT | Mod: S$PBB,,, | Performed by: INTERNAL MEDICINE

## 2019-07-11 PROCEDURE — 36415 COLL VENOUS BLD VENIPUNCTURE: CPT | Mod: PO

## 2019-07-11 PROCEDURE — 99215 OFFICE O/P EST HI 40 MIN: CPT | Mod: PBBFAC,PO,25 | Performed by: NURSE PRACTITIONER

## 2019-07-11 PROCEDURE — 99214 PR OFFICE/OUTPT VISIT, EST, LEVL IV, 30-39 MIN: ICD-10-PCS | Mod: S$PBB,,, | Performed by: NURSE PRACTITIONER

## 2019-07-11 PROCEDURE — 83880 ASSAY OF NATRIURETIC PEPTIDE: CPT

## 2019-07-11 PROCEDURE — 84443 ASSAY THYROID STIM HORMONE: CPT

## 2019-07-11 PROCEDURE — 93010 EKG 12-LEAD: ICD-10-PCS | Mod: S$PBB,,, | Performed by: INTERNAL MEDICINE

## 2019-07-11 PROCEDURE — 99999 PR PBB SHADOW E&M-EST. PATIENT-LVL V: ICD-10-PCS | Mod: PBBFAC,,, | Performed by: NURSE PRACTITIONER

## 2019-07-11 PROCEDURE — 99999 PR PBB SHADOW E&M-EST. PATIENT-LVL V: CPT | Mod: PBBFAC,,, | Performed by: NURSE PRACTITIONER

## 2019-07-11 PROCEDURE — 85025 COMPLETE CBC W/AUTO DIFF WBC: CPT | Mod: PO

## 2019-07-11 PROCEDURE — 93005 ELECTROCARDIOGRAM TRACING: CPT | Mod: PBBFAC,PO | Performed by: INTERNAL MEDICINE

## 2019-07-11 PROCEDURE — 99214 OFFICE O/P EST MOD 30 MIN: CPT | Mod: S$PBB,,, | Performed by: NURSE PRACTITIONER

## 2019-07-11 PROCEDURE — 83036 HEMOGLOBIN GLYCOSYLATED A1C: CPT

## 2019-07-11 NOTE — PROGRESS NOTES
Subjective:       Patient ID: Inge Monge is a 69 y.o. female.    Chief Complaint: Fatigue (noticed 2 months ago, SOB with exertion, insomnia-issues falling aslp and staying aslp)    Here today with fatigue and also insomnia.  She has noticed that she has trouble staying asleep at night.  Waking up up to 3 times a night.  Not necessarily with nocturia but waking up and having a hard time going back to sleep.  She has not been taking any medications for this.  She also complains of shortness of breath with exertion.  No edema no orthopnea.  She also states that she is not taking her Breo and Spiriva because she thought she was only supposed to take them while she was having a crisis.    DM2 - doing well on present regimen of janumet twice a day and metformin 750mg QHS;  Gaining some weight  Not taking BS daily - 220 in afternoon and 130 in am   HLD - tolerating Trilipix and Pravastatin 20mg daily  HTN - tolerating Ramipril 10mg daily , metoprolol and Verapamil; not taking lasix - only with swelling and has not been swelling   [facility]  COPD - stable on Breo and Spiriva; using albuterol - she is not taking these regularly   GERD - taking Aciphex daily         Fatigue   This is a new problem. The current episode started more than 1 month ago (2-3 months). The problem occurs intermittently. The problem has been gradually worsening. Pertinent negatives include no abdominal pain, anorexia, arthralgias, change in bowel habit, chest pain, chills, congestion, coughing, diaphoresis, fatigue, fever, headaches, joint swelling, myalgias, nausea, neck pain, numbness, rash, sore throat, swollen glands or vomiting.   Shortness of Breath   This is a chronic problem. The current episode started more than 1 month ago. The problem occurs daily. The problem has been gradually worsening. Pertinent negatives include no abdominal pain, chest pain, claudication, coryza, ear pain, fever, headaches, hemoptysis,  leg pain, leg swelling, neck pain, orthopnea, PND, rash, rhinorrhea, sore throat, sputum production, swollen glands, syncope, vomiting or wheezing. The symptoms are aggravated by any activity.     Vitals:    07/11/19 1406   BP: 118/76   Pulse: 70   Resp: (!) 22   Temp: 97.6 °F (36.4 °C)     Review of Systems   Constitutional: Negative.  Negative for chills, diaphoresis, fatigue and fever.   HENT: Negative.  Negative for congestion, ear pain, rhinorrhea and sore throat.    Eyes: Negative.    Respiratory: Negative.  Negative for cough, hemoptysis, sputum production, shortness of breath and wheezing.    Cardiovascular: Negative for chest pain, orthopnea, claudication, leg swelling, syncope and PND.   Gastrointestinal: Negative for abdominal pain, anorexia, change in bowel habit, diarrhea, nausea and vomiting.   Endocrine: Negative.    Genitourinary: Negative for dysuria and hematuria.   Musculoskeletal: Negative.  Negative for arthralgias, joint swelling, myalgias and neck pain.   Skin: Negative for color change and rash.   Allergic/Immunologic: Negative.    Neurological: Negative for speech difficulty, numbness and headaches.   Hematological: Negative.    Psychiatric/Behavioral: Negative.        Past Medical History:   Diagnosis Date    CAD (coronary artery disease)     Cataract     Chronic headache     Colon polyps     COPD (chronic obstructive pulmonary disease)     Diabetes mellitus type II     Hyperlipidemia     Hypertension     Hypertrophic cardiomyopathy     ICD (implantable cardiac defibrillator) in place     Osteopenia     Peptic ulcer disease     Valvular heart disease        Objective:      Physical Exam   Constitutional: She is oriented to person, place, and time. She appears well-developed and well-nourished.   HENT:   Head: Normocephalic and atraumatic.   Right Ear: Hearing, tympanic membrane, external ear and ear canal normal.   Left Ear: Hearing, tympanic membrane, external ear and ear  canal normal.   Nose: Nose normal. No mucosal edema, rhinorrhea or sinus tenderness. Right sinus exhibits no maxillary sinus tenderness and no frontal sinus tenderness. Left sinus exhibits no maxillary sinus tenderness and no frontal sinus tenderness.   Mouth/Throat: Uvula is midline, oropharynx is clear and moist and mucous membranes are normal. No oropharyngeal exudate or posterior oropharyngeal edema.   Eyes: Pupils are equal, round, and reactive to light. Conjunctivae and EOM are normal.   Neck: Normal range of motion. Neck supple.   Cardiovascular: Normal rate, regular rhythm, normal heart sounds and intact distal pulses. Exam reveals no friction rub.   No murmur heard.  Pulmonary/Chest: Effort normal and breath sounds normal. No stridor. No respiratory distress. She has no wheezes. She has no rales.   Abdominal: Soft. Bowel sounds are normal.   Musculoskeletal: Normal range of motion. She exhibits no edema or tenderness.   Neurological: She is alert and oriented to person, place, and time. No cranial nerve deficit. Coordination normal.   Skin: Skin is warm and dry.   Psychiatric: She has a normal mood and affect. Her behavior is normal. Judgment and thought content normal.   Nursing note and vitals reviewed.      1.  Little interest or pleasure in doing things: More than half of days  = 2   2.  Feeling down, depressed or hopeless: Not at all                       = 0   3.  Trouble falling or staying asleep, or sleeping too much: More than half of days  = 2   4.  Feeling tired or having little energy: Nearly every day           = 3   5.  Poor appetite or overeating: More than half of days  = 2   6.  Feeling bad about yourself - or that you are a failure or have let yourself or your family down: Not at all                       = 0   7.  Trouble concentrating on things, such as reading the newspaper or watching television: Not at all                       = 0   8.  Moving or speaking so slowly that other people  could have noticed.  Or the opposite - being fidgety or restless that you have been moving around a lot more than usual: Several days                = 1   9.  Thoughts that you would be better off dead, or of hurting yourself: Not at all                       = 0    PHQ-9 Total: 10     Assessment:       1. Fatigue, unspecified type    2. Type 2 diabetes mellitus without complication, without long-term current use of insulin    3. Essential hypertension    4. Coronary artery disease involving native coronary artery of native heart without angina pectoris    5. Chronic obstructive pulmonary disease, unspecified COPD type    6. Hyperlipidemia, unspecified hyperlipidemia type        Plan:       Fatigue, unspecified type  -     Brain natriuretic peptide; Future; Expected date: 07/11/2019  -     TSH; Future; Expected date: 07/11/2019  -     CBC auto differential; Future; Expected date: 07/11/2019  -     IN OFFICE EKG 12-LEAD (to Muse)    Type 2 diabetes mellitus without complication, without long-term current use of insulin  -     Brain natriuretic peptide; Future; Expected date: 07/11/2019  -     TSH; Future; Expected date: 07/11/2019  -     CBC auto differential; Future; Expected date: 07/11/2019    Essential hypertension  -     Brain natriuretic peptide; Future; Expected date: 07/11/2019  -     TSH; Future; Expected date: 07/11/2019  -     CBC auto differential; Future; Expected date: 07/11/2019    Coronary artery disease involving native coronary artery of native heart without angina pectoris  -     Brain natriuretic peptide; Future; Expected date: 07/11/2019  -     TSH; Future; Expected date: 07/11/2019  -     CBC auto differential; Future; Expected date: 07/11/2019    Chronic obstructive pulmonary disease, unspecified COPD type  -     Brain natriuretic peptide; Future; Expected date: 07/11/2019  -     TSH; Future; Expected date: 07/11/2019  -     CBC auto differential; Future; Expected date: 07/11/2019  Need to get  back on medication and inhalers.    Hyperlipidemia, unspecified hyperlipidemia type  On med stable          Based on fill dates of her medications, it does not seem that she is taking these appropriately.  Medications were sent to an [facility] in [facility] and she says that she does not go there anymore.  There is also some stressors going on with her son and grandchildren.  But her grandchildren do bring her enjoyment all the time.  Discussed with her to try and make sure that she helps get her medications straight printed her list of her medications.  Her sister is a nurse and will help her with this.  Do not feel this is cardiac related    Follow-up with PCP as directed   rule

## 2019-07-12 DIAGNOSIS — G47.00 INSOMNIA, UNSPECIFIED TYPE: ICD-10-CM

## 2019-07-12 DIAGNOSIS — R53.83 FATIGUE, UNSPECIFIED TYPE: Primary | ICD-10-CM

## 2019-07-12 DIAGNOSIS — E05.90 HYPERTHYROIDISM, SUBCLINICAL: ICD-10-CM

## 2019-07-12 LAB
BNP SERPL-MCNC: 58 PG/ML (ref 0–99)
ESTIMATED AVG GLUCOSE: 157 MG/DL (ref 68–131)
HBA1C MFR BLD HPLC: 7.1 % (ref 4–5.6)
T4 FREE SERPL-MCNC: 1.05 NG/DL (ref 0.71–1.51)
TSH SERPL DL<=0.005 MIU/L-ACNC: 0.36 UIU/ML (ref 0.4–4)

## 2019-07-18 DIAGNOSIS — J18.9 PNEUMONIA OF RIGHT LOWER LOBE DUE TO INFECTIOUS ORGANISM: ICD-10-CM

## 2019-07-18 RX ORDER — ALBUTEROL SULFATE 0.83 MG/ML
2.5 SOLUTION RESPIRATORY (INHALATION) EVERY 6 HOURS PRN
Qty: 75 ML | Refills: 2 | Status: CANCELLED | OUTPATIENT
Start: 2019-07-18 | End: 2020-07-17

## 2019-07-22 ENCOUNTER — PATIENT OUTREACH (OUTPATIENT)
Dept: ADMINISTRATIVE | Facility: HOSPITAL | Age: 69
End: 2019-07-22

## 2019-07-22 NOTE — PROGRESS NOTES
Health Maintenance Due   Topic Date Due    Aspirin/Antiplatelet Therapy  03/12/1968    Colonoscopy  01/11/2015    Shingles Vaccine (2 of 3) 01/20/2015    Mammogram  03/12/2019    DEXA SCAN  03/14/2019    Eye Exam  03/21/2019     Chart review completed 07/22/2019  Future Appointments   Date Time Provider Department Center   8/5/2019  2:40 PM Roverto Davis MD [facility] FAM MED [place]   8/29/2019 12:15 PM Rony Sneed MD [facility] CARDIO [place]   9/16/2019  9:00 AM Parish Chavarria DO [facility] ENDOCRN [place]

## 2019-07-22 NOTE — LETTER
July 30, 2019    Inge FARRAR Monge  [address]  [place] [address]             [facility]  [address]  [facility] [address]  Phone: [phone] Dear Mrs. Monge:    We have tried to reach you by mychart unsuccessfully.    [facility] is committed to your overall health.  To help you get the most out of each of your visits, we will review your information to make sure you are up to date on all of your recommended tests and/or procedures.       Roverto Davis MD   has found that your chart shows you may be due for the following:     Colonoscopy   Mammogram   DEXA SCAN   Eye Exam     If you have had any of the above done at another facility, please bring the records with you or fax them to [phone] so that your record at [facility] will be complete. If you have not had any of these tests or procedures done recently and would like to complete this testing ,  please call [phone] or send a message through your [facility] portal to your provider's office.     If you have an upcoming scheduled appointment for the above test and/or procedures, please disregard this letter.     If you are currently taking medication, please bring it with you to your appointment for review.     Sincerely,     Your [facility] Leyda Larose   Clinical Care Coordinator   [facility]         If you have any questions or concerns, please don't hesitate to call

## 2019-07-30 ENCOUNTER — CLINICAL SUPPORT (OUTPATIENT)
Dept: CARDIOLOGY | Facility: CLINIC | Age: 69
End: 2019-07-30
Attending: INTERNAL MEDICINE
Payer: MEDICARE

## 2019-07-30 DIAGNOSIS — Z95.810 AUTOMATIC IMPLANTABLE CARDIOVERTER-DEFIBRILLATOR IN SITU: ICD-10-CM

## 2019-07-30 DIAGNOSIS — I47.20 VENTRICULAR TACHYARRHYTHMIA: ICD-10-CM

## 2019-07-31 ENCOUNTER — TELEPHONE (OUTPATIENT)
Dept: FAMILY MEDICINE | Facility: CLINIC | Age: 69
End: 2019-07-31

## 2019-07-31 NOTE — TELEPHONE ENCOUNTER
Paperwork signed and faxed over, spoke with faiza who states they will call if there are any issues

## 2019-07-31 NOTE — TELEPHONE ENCOUNTER
----- Message from Ena Gordon sent at 7/31/2019  2:16 PM CDT -----  Contact: [facility]  Type: Needs Medical Advice    Who Called:  Carlyn Marshall Call Back Number: [phone]  Additional Information: Sending a CMN form to be filled out in its entirety need it back today this is the 2nd form she has sent and has  Not received it back she has to have it back today please she attention it Mami but said anyone can complete it  But its important she gets it back  today

## 2019-08-13 NOTE — PROGRESS NOTES
Refill Authorization Note     is requesting a refill authorization.    Brief assessment and rationale for refill: ROUTE: op   Name and strength of medication: butalbital-acetaminophen-caffeine -40 mg (FIORICET, ESGIC) -40 mg per tablet       Medication Therapy Plan: Headache/migraine- nco; Outside of protocol, Route to you     Medication reconciliation completed: No              How patient will take medication: t1t po q6 prn          Comments:   APPOINTMENTS(past 12m or future 3m authorizing provider)    Date Provider   LAST VISIT  5/2/2019 Roverto Davis MD   NEXT VISIT  9/4/2019 Roverto Davis MD

## 2019-08-14 RX ORDER — BUTALBITAL, ACETAMINOPHEN AND CAFFEINE 50; 325; 40 MG/1; MG/1; MG/1
TABLET ORAL
Qty: 120 TABLET | Refills: 0 | Status: SHIPPED | OUTPATIENT
Start: 2019-08-14 | End: 2019-09-25 | Stop reason: SDUPTHER

## 2019-08-28 ENCOUNTER — TELEPHONE (OUTPATIENT)
Dept: FAMILY MEDICINE | Facility: CLINIC | Age: 69
End: 2019-08-28

## 2019-08-28 NOTE — TELEPHONE ENCOUNTER
----- Message from Frankie Quevedo sent at 8/27/2019  4:21 PM CDT -----  Contact: Andrey/Med management  Type: Needs Medical Advice    Who Called:  Andrey  Symptoms (please be specific):  Need detailed medication list for this patient  Best Call Back Number: [phone]  Additional Information: please call

## 2019-08-29 ENCOUNTER — TELEPHONE (OUTPATIENT)
Dept: CARDIOLOGY | Facility: CLINIC | Age: 69
End: 2019-08-29

## 2019-08-29 NOTE — TELEPHONE ENCOUNTER
----- Message from Asia Espitia sent at 8/29/2019 12:44 PM CDT -----  Type: Needs Medical Advice    Who Called: self  Symptoms (please be specific):    How long has patient had these symptoms:   Pharmacy name and phone #:    Best Call Back Number: [phone]  Additional Information: Patient missed the appointment for today. Patient was advised she has an appointment tomorrow. Patient want to confirm with the nurse.

## 2019-08-30 ENCOUNTER — OFFICE VISIT (OUTPATIENT)
Dept: CARDIOLOGY | Facility: CLINIC | Age: 69
End: 2019-08-30
Payer: MEDICARE

## 2019-08-30 VITALS
DIASTOLIC BLOOD PRESSURE: 79 MMHG | HEIGHT: 66 IN | WEIGHT: 158.31 LBS | HEART RATE: 70 BPM | BODY MASS INDEX: 25.44 KG/M2 | SYSTOLIC BLOOD PRESSURE: 135 MMHG

## 2019-08-30 DIAGNOSIS — I42.2 HYPERTROPHIC CARDIOMYOPATHY: ICD-10-CM

## 2019-08-30 DIAGNOSIS — Z95.810 AUTOMATIC IMPLANTABLE CARDIOVERTER-DEFIBRILLATOR IN SITU: ICD-10-CM

## 2019-08-30 DIAGNOSIS — E78.5 HYPERLIPIDEMIA, UNSPECIFIED HYPERLIPIDEMIA TYPE: ICD-10-CM

## 2019-08-30 DIAGNOSIS — I25.10 CORONARY ARTERY DISEASE INVOLVING NATIVE CORONARY ARTERY OF NATIVE HEART WITHOUT ANGINA PECTORIS: Primary | ICD-10-CM

## 2019-08-30 DIAGNOSIS — I10 ESSENTIAL HYPERTENSION: ICD-10-CM

## 2019-08-30 PROCEDURE — 99999 PR PBB SHADOW E&M-EST. PATIENT-LVL III: ICD-10-PCS | Mod: PBBFAC,,, | Performed by: INTERNAL MEDICINE

## 2019-08-30 PROCEDURE — 99213 OFFICE O/P EST LOW 20 MIN: CPT | Mod: PBBFAC,PO | Performed by: INTERNAL MEDICINE

## 2019-08-30 PROCEDURE — 99214 PR OFFICE/OUTPT VISIT, EST, LEVL IV, 30-39 MIN: ICD-10-PCS | Mod: S$PBB,,, | Performed by: INTERNAL MEDICINE

## 2019-08-30 PROCEDURE — 99999 PR PBB SHADOW E&M-EST. PATIENT-LVL III: CPT | Mod: PBBFAC,,, | Performed by: INTERNAL MEDICINE

## 2019-08-30 PROCEDURE — 99214 OFFICE O/P EST MOD 30 MIN: CPT | Mod: S$PBB,,, | Performed by: INTERNAL MEDICINE

## 2019-08-30 NOTE — PROGRESS NOTES
Subjective:    Patient ID:  Inge Monge is a 69 y.o. female who presents for follow-up of cad, HCM    HPI  She comes with no complaints, no chest pain, no shortness of breath  Main problem is tiredness, fatigue    Review of Systems   Constitution: Positive for malaise/fatigue. Negative for decreased appetite, weight gain and weight loss.   Cardiovascular: Negative for chest pain, dyspnea on exertion, leg swelling, palpitations and syncope.   Respiratory: Negative for cough and shortness of breath.    Gastrointestinal: Negative.    Neurological: Negative for weakness.   All other systems reviewed and are negative.       Objective:      Physical Exam   Constitutional: She is oriented to person, place, and time. She appears well-developed and well-nourished.   HENT:   Head: Normocephalic.   Eyes: Pupils are equal, round, and reactive to light.   Neck: Normal range of motion. Neck supple. No JVD present. Carotid bruit is not present. No thyromegaly present.   Cardiovascular: Normal rate, regular rhythm, normal heart sounds, intact distal pulses and normal pulses. PMI is not displaced. Exam reveals no gallop.   No murmur heard.  Pulmonary/Chest: Effort normal and breath sounds normal.   Abdominal: Soft. Normal appearance. She exhibits no mass. There is no hepatosplenomegaly. There is no tenderness.   Musculoskeletal: Normal range of motion. She exhibits no edema.   Neurological: She is alert and oriented to person, place, and time. She has normal strength and normal reflexes. No sensory deficit.   Skin: Skin is warm and intact.   Psychiatric: She has a normal mood and affect.   Nursing note and vitals reviewed.        Assessment:       1. Coronary artery disease involving native coronary artery of native heart without angina pectoris    2. Essential hypertension    3. Hypertrophic cardiomyopathy    4. Hyperlipidemia, unspecified hyperlipidemia type    5. Automatic implantable cardioverter-defibrillator in situ          Plan:   Echo; call with results  Continue all cardiac medications  Regular exercise program  6 m f/u if echo ok

## 2019-08-31 ENCOUNTER — CLINICAL SUPPORT (OUTPATIENT)
Dept: CARDIOLOGY | Facility: CLINIC | Age: 69
End: 2019-08-31
Payer: MEDICARE

## 2019-09-04 ENCOUNTER — TELEPHONE (OUTPATIENT)
Dept: CARDIOLOGY | Facility: CLINIC | Age: 69
End: 2019-09-04

## 2019-09-04 ENCOUNTER — HOSPITAL ENCOUNTER (OUTPATIENT)
Dept: RADIOLOGY | Facility: HOSPITAL | Age: 69
Discharge: HOME | End: 2019-09-04
Attending: FAMILY MEDICINE
Payer: MEDICARE

## 2019-09-04 ENCOUNTER — OFFICE VISIT (OUTPATIENT)
Dept: FAMILY MEDICINE | Facility: CLINIC | Age: 69
End: 2019-09-04
Payer: MEDICARE

## 2019-09-04 VITALS
WEIGHT: 158.5 LBS | DIASTOLIC BLOOD PRESSURE: 84 MMHG | OXYGEN SATURATION: 96 % | SYSTOLIC BLOOD PRESSURE: 124 MMHG | HEIGHT: 66 IN | BODY MASS INDEX: 25.47 KG/M2 | HEART RATE: 70 BPM

## 2019-09-04 DIAGNOSIS — Z78.0 MENOPAUSE: ICD-10-CM

## 2019-09-04 DIAGNOSIS — I25.10 CORONARY ARTERY DISEASE INVOLVING NATIVE CORONARY ARTERY OF NATIVE HEART WITHOUT ANGINA PECTORIS: ICD-10-CM

## 2019-09-04 DIAGNOSIS — E78.5 HYPERLIPIDEMIA, UNSPECIFIED HYPERLIPIDEMIA TYPE: ICD-10-CM

## 2019-09-04 DIAGNOSIS — R53.83 FATIGUE, UNSPECIFIED TYPE: Primary | ICD-10-CM

## 2019-09-04 DIAGNOSIS — I10 ESSENTIAL HYPERTENSION: ICD-10-CM

## 2019-09-04 DIAGNOSIS — G31.84 MCI (MILD COGNITIVE IMPAIRMENT): ICD-10-CM

## 2019-09-04 DIAGNOSIS — E11.9 TYPE 2 DIABETES MELLITUS WITHOUT COMPLICATION, WITHOUT LONG-TERM CURRENT USE OF INSULIN: ICD-10-CM

## 2019-09-04 PROCEDURE — 77080 DEXA BONE DENSITY SPINE HIP: ICD-10-PCS | Mod: 26,,, | Performed by: RADIOLOGY

## 2019-09-04 PROCEDURE — 99214 PR OFFICE/OUTPT VISIT, EST, LEVL IV, 30-39 MIN: ICD-10-PCS | Mod: S$PBB,,, | Performed by: FAMILY MEDICINE

## 2019-09-04 PROCEDURE — 77080 DXA BONE DENSITY AXIAL: CPT | Mod: 26,,, | Performed by: RADIOLOGY

## 2019-09-04 PROCEDURE — 99999 PR PBB SHADOW E&M-EST. PATIENT-LVL IV: CPT | Mod: PBBFAC,,, | Performed by: FAMILY MEDICINE

## 2019-09-04 PROCEDURE — 99214 OFFICE O/P EST MOD 30 MIN: CPT | Mod: PBBFAC,PO,25 | Performed by: FAMILY MEDICINE

## 2019-09-04 PROCEDURE — 99214 OFFICE O/P EST MOD 30 MIN: CPT | Mod: S$PBB,,, | Performed by: FAMILY MEDICINE

## 2019-09-04 PROCEDURE — 99999 PR PBB SHADOW E&M-EST. PATIENT-LVL IV: ICD-10-PCS | Mod: PBBFAC,,, | Performed by: FAMILY MEDICINE

## 2019-09-04 PROCEDURE — 77080 DXA BONE DENSITY AXIAL: CPT | Mod: TC,PO

## 2019-09-04 NOTE — PROGRESS NOTES
"Chief Complaint   Patient presents with    Fatigue     2 weeks       HISTORY OF PRESENT ILLNESS:     Here for 3 month f/u on chronic health issues    C/o some fatigue/tiredness for the last few weeks    MCI - tolerating aricept 5mg daily  DM2 - doing well on present regimen of janumet twice a day and metformin 750mg QHS; BG's 140; admits to drinking Cokes regularly  HLD - tolerating Trilipix and Pravastatin 20mg daily  HTN - tolerating Ramipril 10mg daily , metoprolol and Verapamil; using lasix as needed but rarely  CAD - following with Dr. Masterson  COPD - stable on Breo and Spiriva; using albuterol   GERD - taking Aciphex daily    PAST MEDICAL HISTORY:   Type 2 diabetes, coronary artery disease, COPD,   hypertension, mitral valve regurgitation, hypertrophic cardiomyopathy s/p dual chamber,   hypertriglyceridemia, peptic ulcer disease, osteopenia, elevated CPK (worse with statins)sz and colon polyps.     REVIEW OF SYSTEMS: She denies any chest pain, shortness of breath,   dizziness, palpitations, and lower extremity swelling.   + fatigue    PHYSICAL EXAM:   /84   Pulse 70   Ht 5' 6" (1.676 m)   Wt 71.9 kg (158 lb 8.2 oz)   SpO2 96%   BMI 25.58 kg/m²   GENERAL: A healthy-appearing 69-year-old white female, who sitting   upright, in no apparent distress, alert and oriented x4.   Posterior oropharynx with some PND. TMs clear  NECK: Supple. There is no lymphadenopathy, thyromegaly or JVD.   CHEST: Clear to auscultation bilaterally with good respiratory movement.   CARDIOVASCULAR: S1 and S2. Regular rate and rhythm. No murmurs, rubs or gallops.   ABDOMEN: Benign.   EXTREMITIES: Show no cyanosis, clubbing or edema.     Results for orders placed or performed in visit on 07/11/19   Hemoglobin A1c   Result Value Ref Range    Hemoglobin A1C 7.1 (H) 4.0 - 5.6 %    Estimated Avg Glucose 157 (H) 68 - 131 mg/dL   Comprehensive metabolic panel   Result Value Ref Range    Sodium 141 136 - 145 mmol/L    Potassium 4.3 3.5 - " 5.1 mmol/L    Chloride 106 95 - 110 mmol/L    CO2 23 23 - 29 mmol/L    Glucose 120 (H) 70 - 110 mg/dL    BUN, Bld 16 8 - 23 mg/dL    Creatinine 1.1 0.5 - 1.4 mg/dL    Calcium 9.4 8.7 - 10.5 mg/dL    Total Protein 7.4 6.0 - 8.4 g/dL    Albumin 4.4 3.5 - 5.2 g/dL    Total Bilirubin 0.2 0.1 - 1.0 mg/dL    Alkaline Phosphatase 40 (L) 55 - 135 U/L    AST 24 10 - 40 U/L    ALT 25 10 - 44 U/L    Anion Gap 12 8 - 16 mmol/L    eGFR if African American 59 (A) >60 mL/min/1.73 m^2    eGFR if non African American 51 (A) >60 mL/min/1.73 m^2   Brain natriuretic peptide   Result Value Ref Range    BNP 58 0 - 99 pg/mL   TSH   Result Value Ref Range    TSH 0.360 (L) 0.400 - 4.000 uIU/mL   CBC auto differential   Result Value Ref Range    WBC 7.26 3.90 - 12.70 K/uL    RBC 4.20 4.00 - 5.40 M/uL    Hemoglobin 12.8 12.0 - 16.0 g/dL    Hematocrit 38.1 37.0 - 48.5 %    Mean Corpuscular Volume 91 82 - 98 fL    Mean Corpuscular Hemoglobin 30.5 27.0 - 31.0 pg    Mean Corpuscular Hemoglobin Conc 33.6 32.0 - 36.0 g/dL    RDW 13.1 11.5 - 14.5 %    Platelets 221 150 - 350 K/uL    MPV 9.7 9.2 - 12.9 fL    Gran # (ANC) 4.3 1.8 - 7.7 K/uL    Lymph # 2.2 1.0 - 4.8 K/uL    Mono # 0.7 0.3 - 1.0 K/uL    Eos # 0.1 0.0 - 0.5 K/uL    Baso # 0.04 0.00 - 0.20 K/uL    Gran% 59.0 38.0 - 73.0 %    Lymph% 29.6 18.0 - 48.0 %    Mono% 9.4 4.0 - 15.0 %    Eosinophil% 1.4 0.0 - 8.0 %    Basophil% 0.6 0.0 - 1.9 %    Differential Method Automated    T4, free   Result Value Ref Range    Free T4 1.05 0.71 - 1.51 ng/dL         Fatigue, unspecified type    Type 2 diabetes mellitus without complication, without long-term current use of insulin  -     Ambulatory referral to Optometry  -     Hemoglobin A1c; Future; Expected date: 12/03/2019  -     Lipid panel; Future; Expected date: 12/03/2019  -     Comprehensive metabolic panel; Future; Expected date: 12/03/2019    Essential hypertension    Coronary artery disease involving native coronary artery of native heart without  angina pectoris    Hyperlipidemia, unspecified hyperlipidemia type    MCI (mild cognitive impairment)    Menopause  -     DXA Bone Density Spine And Hip; Future; Expected date: 09/04/2019      Stop Aricept. This is likely causing her fatigue and sleep issues.  Consider namenda at next appt after Aricept has worn off  F/u with pulmonology and cardiology as planned  continue Trilipix,  pravastatin 20mg daily; Krill fish oil 1,000mg daily  Continue present meds  continue diabetic diet efforts and present meds    F/u 3 months with labs.

## 2019-09-04 NOTE — TELEPHONE ENCOUNTER
Rec'd notification from [facility] [place] for:    1 VT monitor episodes EGM from 8/9/19 illustrates nsVT (VT w retrograde conduction)for 15s @ 162bpm in the monitor zone.    Dr. Sneed notified, pt was seen in clinic on 8/30/19.  No new orders rec'd.    See attachment for EGM.

## 2019-09-05 ENCOUNTER — TELEPHONE (OUTPATIENT)
Dept: FAMILY MEDICINE | Facility: CLINIC | Age: 69
End: 2019-09-05

## 2019-09-05 NOTE — TELEPHONE ENCOUNTER
----- Message from Alvaro Gutiérrez sent at 9/5/2019  4:45 PM CDT -----  Contact: Shantanu with Pharmacist with [facility]   Type: Needs Medical Advice    Who Called:  Shantanu with Pharmacist with [facility]   Best Call Back Number: [phone]   Additional Information: Requesting to speak with office about patient's current medication list and to find out if they are cognitively impaired. Please advise

## 2019-09-06 ENCOUNTER — TELEPHONE (OUTPATIENT)
Dept: CARDIOLOGY | Facility: CLINIC | Age: 69
End: 2019-09-06

## 2019-09-06 NOTE — TELEPHONE ENCOUNTER
Spoke with patient, and advised her she hasnt had a Echo recently. Patient verbally agreed    ----- Message from Lorin Shrestha sent at 9/6/2019  2:21 PM CDT -----  Contact: patient  Type: Needs Medical Advice    Who Called:  patient  Symptoms (please be specific):  na  How long has patient had these symptoms:  na  Pharmacy name and phone #:  na  Best Call Back Number:   Additional Information: Patient states she thinks she has already done Echo but is scheduled for the 13th of this month.  Please call to advise Thanks.

## 2019-09-09 ENCOUNTER — TELEPHONE (OUTPATIENT)
Dept: FAMILY MEDICINE | Facility: CLINIC | Age: 69
End: 2019-09-09

## 2019-09-09 RX ORDER — ALENDRONATE SODIUM 70 MG/1
70 TABLET ORAL
Qty: 4 TABLET | Refills: 11 | Status: ON HOLD | OUTPATIENT
Start: 2019-09-09 | End: 2020-05-06

## 2019-09-09 NOTE — TELEPHONE ENCOUNTER
Inform patient her bone density test showed osteoporosis, and her risk of fracturing a bone if she falls is greater than 20%.  Medication is recommended to help improve her bone density. If she is open to taking this, I will send in Fosamax 70mg weekly.

## 2019-09-10 NOTE — TELEPHONE ENCOUNTER
Spoke with pt and informed her that Dr. Davis has sent her Rx for fosamax to her pharmacy. Pt verbalized understanding.

## 2019-09-13 ENCOUNTER — CLINICAL SUPPORT (OUTPATIENT)
Dept: CARDIOLOGY | Facility: CLINIC | Age: 69
End: 2019-09-13
Attending: INTERNAL MEDICINE
Payer: MEDICARE

## 2019-09-13 VITALS
BODY MASS INDEX: 25.39 KG/M2 | WEIGHT: 158 LBS | DIASTOLIC BLOOD PRESSURE: 65 MMHG | HEART RATE: 68 BPM | SYSTOLIC BLOOD PRESSURE: 118 MMHG | HEIGHT: 66 IN

## 2019-09-13 DIAGNOSIS — I25.10 CORONARY ARTERY DISEASE INVOLVING NATIVE CORONARY ARTERY OF NATIVE HEART WITHOUT ANGINA PECTORIS: ICD-10-CM

## 2019-09-13 DIAGNOSIS — E78.5 HYPERLIPIDEMIA, UNSPECIFIED HYPERLIPIDEMIA TYPE: ICD-10-CM

## 2019-09-13 DIAGNOSIS — Z95.810 AUTOMATIC IMPLANTABLE CARDIOVERTER-DEFIBRILLATOR IN SITU: ICD-10-CM

## 2019-09-13 DIAGNOSIS — I10 ESSENTIAL HYPERTENSION: ICD-10-CM

## 2019-09-13 DIAGNOSIS — I42.2 HYPERTROPHIC CARDIOMYOPATHY: ICD-10-CM

## 2019-09-13 PROCEDURE — 99999 PR PBB SHADOW E&M-EST. PATIENT-LVL II: CPT | Mod: PBBFAC,,,

## 2019-09-13 PROCEDURE — 93306 ECHO (CUPID ONLY): ICD-10-PCS | Mod: 26,S$PBB,, | Performed by: INTERNAL MEDICINE

## 2019-09-13 PROCEDURE — 99212 OFFICE O/P EST SF 10 MIN: CPT | Mod: PBBFAC,PO,25

## 2019-09-13 PROCEDURE — 99999 PR PBB SHADOW E&M-EST. PATIENT-LVL II: ICD-10-PCS | Mod: PBBFAC,,,

## 2019-09-13 PROCEDURE — 93306 TTE W/DOPPLER COMPLETE: CPT | Mod: PBBFAC,PO | Performed by: INTERNAL MEDICINE

## 2019-09-16 LAB
ASCENDING AORTA: 2.78 CM
AV INDEX (PROSTH): 0.83
AV MEAN GRADIENT: 7 MMHG
AV PEAK GRADIENT: 11 MMHG
AV VALVE AREA: 2.84 CM2
AV VELOCITY RATIO: 0.65
BSA FOR ECHO PROCEDURE: 1.83 M2
CV ECHO LV RWT: 0.37 CM
DOP CALC AO PEAK VEL: 1.64 M/S
DOP CALC AO VTI: 28.64 CM
DOP CALC LVOT AREA: 3.4 CM2
DOP CALC LVOT DIAMETER: 2.09 CM
DOP CALC LVOT PEAK VEL: 1.07 M/S
DOP CALC LVOT STROKE VOLUME: 81.33 CM3
DOP CALCLVOT PEAK VEL VTI: 23.72 CM
E WAVE DECELERATION TIME: 130.17 MSEC
E/A RATIO: 0.67
E/E' RATIO: 9.82 M/S
ECHO LV POSTERIOR WALL: 0.83 CM (ref 0.6–1.1)
FRACTIONAL SHORTENING: 41 % (ref 28–44)
INTERVENTRICULAR SEPTUM: 1.12 CM (ref 0.6–1.1)
LA MAJOR: 5.06 CM
LA WIDTH: 4.22 CM
LEFT ATRIUM SIZE: 3.74 CM
LEFT INTERNAL DIMENSION IN SYSTOLE: 2.68 CM (ref 2.1–4)
LEFT VENTRICLE DIASTOLIC VOLUME INDEX: 51.76 ML/M2
LEFT VENTRICLE DIASTOLIC VOLUME: 93.66 ML
LEFT VENTRICLE MASS INDEX: 83 G/M2
LEFT VENTRICLE SYSTOLIC VOLUME INDEX: 14.7 ML/M2
LEFT VENTRICLE SYSTOLIC VOLUME: 26.62 ML
LEFT VENTRICULAR INTERNAL DIMENSION IN DIASTOLE: 4.53 CM (ref 3.5–6)
LEFT VENTRICULAR MASS: 149.64 G
LV LATERAL E/E' RATIO: 9 M/S
LV SEPTAL E/E' RATIO: 10.8 M/S
MV PEAK A VEL: 0.81 M/S
MV PEAK E VEL: 0.54 M/S
PISA TR MAX VEL: 2.91 M/S
PULM VEIN S/D RATIO: 1.19
PV PEAK D VEL: 0.37 M/S
PV PEAK S VEL: 0.44 M/S
RA MAJOR: 3.99 CM
RA PRESSURE: 8 MMHG
RA WIDTH: 3.1 CM
RIGHT VENTRICULAR END-DIASTOLIC DIMENSION: 4.07 CM
RV TISSUE DOPPLER FREE WALL SYSTOLIC VELOCITY 1 (APICAL 4 CHAMBER VIEW): 12.32 CM/S
SINUS: 2.83 CM
STJ: 3.03 CM
TDI LATERAL: 0.06 M/S
TDI SEPTAL: 0.05 M/S
TDI: 0.06 M/S
TR MAX PG: 34 MMHG
TRICUSPID ANNULAR PLANE SYSTOLIC EXCURSION: 1.84 CM
TV REST PULMONARY ARTERY PRESSURE: 42 MMHG

## 2019-09-17 ENCOUNTER — TELEPHONE (OUTPATIENT)
Dept: HOME HEALTH SERVICES | Facility: HOSPITAL | Age: 69
End: 2019-09-17

## 2019-09-18 DIAGNOSIS — Z95.810 AUTOMATIC IMPLANTABLE CARDIOVERTER-DEFIBRILLATOR IN SITU: Primary | ICD-10-CM

## 2019-09-18 DIAGNOSIS — I42.2 HYPERTROPHIC CARDIOMYOPATHY: ICD-10-CM

## 2019-09-18 DIAGNOSIS — I44.2 AV BLOCK, COMPLETE: ICD-10-CM

## 2019-09-20 ENCOUNTER — TELEPHONE (OUTPATIENT)
Dept: FAMILY MEDICINE | Facility: CLINIC | Age: 69
End: 2019-09-20

## 2019-09-20 NOTE — TELEPHONE ENCOUNTER
----- Message from Martha Lane sent at 9/20/2019  2:10 PM CDT -----  Type: Needs Medical Advice    Who Called:  [facility]  Best Call Back Number: [phone]  Additional Information: has a list of the patient's medications and need to verify if the medications are still active. Please give call back

## 2019-09-20 NOTE — TELEPHONE ENCOUNTER
[facility] representative that I could not give out medical information concerning pt, referred to medical records. They verbalized understanding

## 2019-09-25 DIAGNOSIS — G43.909 MIGRAINE WITHOUT STATUS MIGRAINOSUS, NOT INTRACTABLE, UNSPECIFIED MIGRAINE TYPE: Primary | ICD-10-CM

## 2019-09-25 RX ORDER — BUTALBITAL, ACETAMINOPHEN AND CAFFEINE 50; 325; 40 MG/1; MG/1; MG/1
TABLET ORAL
Qty: 120 TABLET | Refills: 2 | Status: SHIPPED | OUTPATIENT
Start: 2019-09-25 | End: 2019-12-23

## 2019-09-25 RX ORDER — BUTALBITAL, ACETAMINOPHEN AND CAFFEINE 50; 325; 40 MG/1; MG/1; MG/1
1 TABLET ORAL EVERY 6 HOURS PRN
Qty: 120 TABLET | Refills: 0 | OUTPATIENT
Start: 2019-09-25

## 2019-09-25 NOTE — TELEPHONE ENCOUNTER
----- Message from Sebastian Nelson sent at 9/25/2019 11:20 AM CDT -----  Contact: same  Type:  RX Refill Request    Who Called:  patient  Refill or New Rx:  refill  RX Name and Strength:  butalbital-acetaminophen-caffeine -40 mg (FIORICET, ESGIC) -40 mg per tablet  How is the patient currently taking it? (ex. 1XDay):  TAKE 1 TABLET BY MOUTH EVERY 6 HOURS AS NEEDED  Is this a 30 day or 90 day RX:  120 tablets with 0 refills last filled on 8/14/2019   Preferred Pharmacy with phone number:    [id number] - [place] - [address] [place] [address]  Phone: [phone] Fax: [phone]  Ordering Provider:  Susan Marshall Call Back Number:  [phone]  Additional Information:  n/a

## 2019-09-25 NOTE — PROGRESS NOTES
Refill Authorization Note     is requesting a refill authorization.    Brief assessment and rationale for refill: ROUTE: OP  Name and strength of medication: BUTALBITAL/ACETAMINOPHEN/CAFF TABS            Medication reconciliation completed: No              How patient will take medication: t1t q6h          Comments:   Appointments past 12m or future 3m    Date Provider   Last Visit   9/4/2019 Roverto Davis MD   Next Visit   12/4/2019 Roverto Davis MD

## 2019-10-04 PROCEDURE — 93297 REM INTERROG DEV EVAL ICPMS: CPT | Mod: ,,, | Performed by: INTERNAL MEDICINE

## 2019-10-04 PROCEDURE — 93297 CARDIAC DEVICE CHECK - REMOTE: ICD-10-PCS | Mod: ,,, | Performed by: INTERNAL MEDICINE

## 2019-11-03 ENCOUNTER — CLINICAL SUPPORT (OUTPATIENT)
Dept: CARDIOLOGY | Facility: CLINIC | Age: 69
End: 2019-11-03
Payer: MEDICARE

## 2019-11-21 ENCOUNTER — PATIENT OUTREACH (OUTPATIENT)
Dept: ADMINISTRATIVE | Facility: HOSPITAL | Age: 69
End: 2019-11-21

## 2019-12-21 DIAGNOSIS — G43.909 MIGRAINE WITHOUT STATUS MIGRAINOSUS, NOT INTRACTABLE, UNSPECIFIED MIGRAINE TYPE: ICD-10-CM

## 2019-12-23 RX ORDER — BUTALBITAL, ACETAMINOPHEN AND CAFFEINE 50; 325; 40 MG/1; MG/1; MG/1
TABLET ORAL
Qty: 120 TABLET | Refills: 2 | Status: SHIPPED | OUTPATIENT
Start: 2019-12-23 | End: 2020-03-23 | Stop reason: SDUPTHER

## 2019-12-24 ENCOUNTER — DOCUMENTATION ONLY (OUTPATIENT)
Dept: FAMILY MEDICINE | Facility: CLINIC | Age: 69
End: 2019-12-24

## 2019-12-24 NOTE — PROGRESS NOTES
PA:  Butalbital-APAP-Caffeine -40 mg capsules    CMM key:  [id number]  Case ID: [id number]  [employer]    12/26/19:  Received form for more information... Filled out and faxed to [phone]  12/30/19: Received another fax requesting questions be answered... Answered questions and faxed to [phone].  -------------------------------------  Determination:   APPROVED  Valid:  12/24/19-12/23/2020

## 2019-12-26 ENCOUNTER — TELEPHONE (OUTPATIENT)
Dept: FAMILY MEDICINE | Facility: CLINIC | Age: 69
End: 2019-12-26

## 2019-12-26 NOTE — TELEPHONE ENCOUNTER
----- Message from Paulo Pretty sent at 12/26/2019  9:45 AM CST -----  Contact: Reny/[facility] authorization dept  Ms.Reny called in and wanted information on 's prior authorization and would like a call from the office .  can be reached at    [phone]

## 2019-12-26 NOTE — TELEPHONE ENCOUNTER
Called and spoke to Allen at [facility]... Answered questions required.

## 2019-12-30 ENCOUNTER — DOCUMENTATION ONLY (OUTPATIENT)
Dept: FAMILY MEDICINE | Facility: CLINIC | Age: 69
End: 2019-12-30

## 2020-01-02 ENCOUNTER — TELEPHONE (OUTPATIENT)
Dept: FAMILY MEDICINE | Facility: CLINIC | Age: 70
End: 2020-01-02

## 2020-01-02 NOTE — TELEPHONE ENCOUNTER
----- Message from Dora Montague sent at 1/2/2020  1:15 PM CST -----  Type:  Sooner Apoointment Request    Caller is requesting a sooner appointment.  Caller declined first available appointment listed below.  Caller will not accept being placed on the waitlist and is requesting a message be sent to doctor.    Name of Caller:  Inge  When is the first available appointment?  1/14  Symptoms:  She missed her appt on 12/4, this would be a follow up appt and she said all she wants to do is sleep.  She only wants to see Dr Rubalcava.    Best Call Back Number:  [phone]  Additional Information:  Thank you!

## 2020-01-29 NOTE — TELEPHONE ENCOUNTER
----- Message from Sebastian NIETO Friclaudia sent at 1/29/2020  2:10 PM CST -----  Contact: same  Patient called in and stated she let her Rx refills for RABEprazole (ACIPHEX) 20 mg tablet 90 tablets with 3 Refills last filled on 5/2/2019,  Sig - Route: Take 1 tablet (20 mg total) by mouth once daily. - Oral run out and needs a new Rx called into the following:      [id number] - [address] AT [facility] [address] [address]  Phone: [phone] Fax: [phone]    Patient call back number is [phone]

## 2020-01-31 RX ORDER — RABEPRAZOLE SODIUM 20 MG/1
20 TABLET, DELAYED RELEASE ORAL DAILY
Qty: 90 TABLET | Refills: 0 | Status: SHIPPED | OUTPATIENT
Start: 2020-01-31 | End: 2020-08-03

## 2020-01-31 NOTE — PROGRESS NOTES
Refill Authorization Note     is requesting a refill authorization.    Brief assessment and rationale for refill: APPROVE: prr          Medication Therapy Plan: GERD lco(9/19)                              Comments:   Requested Prescriptions   Pending Prescriptions Disp Refills    RABEprazole (ACIPHEX) 20 mg tablet 90 tablet 0     Sig: Take 1 tablet (20 mg total) by mouth once daily.       Gastroenterology: Proton Pump Inhibitors Failed - 1/29/2020  2:19 PM        Failed - An appropriate indication is on the problem list        Passed - Patient is at least 18 years old        Passed - Osteoporosis is not on problem list        Passed - Plavix is not on active medication list        Passed - Office visit in past 6 months or future 90 days.     Recent Outpatient Visits            4 months ago Fatigue, unspecified type    [facility] Roverto Davis MD    5 months ago Coronary artery disease involving native coronary artery of native heart without angina pectoris    [facility] Rony Sneed MD    6 months ago Fatigue, unspecified type    [facility] Petra Dunn NP    9 months ago Type 2 diabetes mellitus without complication, without long-term current use of insulin    [facility] Roverto Davis MD    10 months ago Hypertrophic cardiomyopathy    [facility]          Future Appointments              In 1 week PACEMAKER, AMPARO Christensen - CardiologyAmparo    In 1 week ALEJO Padgett - Optometry[facility]    In 1 month HOME MONITOR DEVICE CHECK, [facility] Amparo  Cardiology[facility]paro

## 2020-02-07 ENCOUNTER — TELEPHONE (OUTPATIENT)
Dept: CARDIOLOGY | Facility: CLINIC | Age: 70
End: 2020-02-07

## 2020-02-07 NOTE — TELEPHONE ENCOUNTER
----- Message from Terrance Baltazar sent at 2/7/2020  1:06 PM CST -----  Contact: pt  Type: Needs Medical Advice    Who Called:  pt    Best Call Back Number: [phone]  Additional Information: Would like to speak to a nurse in the office. Would not give any further information.

## 2020-02-11 ENCOUNTER — PATIENT OUTREACH (OUTPATIENT)
Dept: ADMINISTRATIVE | Facility: OTHER | Age: 70
End: 2020-02-11

## 2020-03-23 DIAGNOSIS — G43.909 MIGRAINE WITHOUT STATUS MIGRAINOSUS, NOT INTRACTABLE, UNSPECIFIED MIGRAINE TYPE: ICD-10-CM

## 2020-03-23 RX ORDER — BUTALBITAL, ACETAMINOPHEN AND CAFFEINE 50; 325; 40 MG/1; MG/1; MG/1
1 TABLET ORAL EVERY 6 HOURS PRN
Qty: 120 TABLET | Refills: 2 | Status: SHIPPED | OUTPATIENT
Start: 2020-03-23 | End: 2020-06-12 | Stop reason: SDUPTHER

## 2020-04-13 ENCOUNTER — TELEPHONE (OUTPATIENT)
Dept: CARDIOLOGY | Facility: CLINIC | Age: 70
End: 2020-04-13

## 2020-04-13 NOTE — TELEPHONE ENCOUNTER
The patients' implantable cardiac device has reached ELECTIVE REPLACEMENT.     Current Device  and Model:      Date of DAVID, if known: 3/26/2020    Pacemaker Dependent: yes, last in clinic check 10/25/2018 SR w/ CHB  -100%     Anticoagulation Status:NONE    Last EF: 45% 9/13/19      MRI compatible: No    historical out of range HV impedance(10/19) however normal today @ 51 ohms.       Left message on sister's cell requesting a return call from pt. To discuss DAVID status, VM not set up on pt's phone    Message forwarded to Dr. Sneed for review

## 2020-04-15 ENCOUNTER — TELEPHONE (OUTPATIENT)
Dept: CARDIOLOGY | Facility: CLINIC | Age: 70
End: 2020-04-15

## 2020-04-15 ENCOUNTER — PATIENT MESSAGE (OUTPATIENT)
Dept: CARDIOLOGY | Facility: CLINIC | Age: 70
End: 2020-04-15

## 2020-04-15 DIAGNOSIS — I45.9 HEART BLOCK: ICD-10-CM

## 2020-04-15 DIAGNOSIS — Z45.02 AICD AT END OF BATTERY LIFE: Primary | ICD-10-CM

## 2020-04-15 NOTE — TELEPHONE ENCOUNTER
----- Message from Julianna Colmenares sent at 4/15/2020  1:17 PM CDT -----  Contact: pt [phone]  Patient called back she is returning a call back from the office. Pt states she is returning a   Call back to Desiree.

## 2020-04-16 ENCOUNTER — TELEPHONE (OUTPATIENT)
Dept: CARDIOLOGY | Facility: CLINIC | Age: 70
End: 2020-04-16

## 2020-04-16 NOTE — TELEPHONE ENCOUNTER
----- Message from Mami Reyes sent at 4/16/2020  9:09 AM CDT -----  Good Morning,     Just need to verify cpt code 92626.  Thank you

## 2020-04-17 ENCOUNTER — TELEPHONE (OUTPATIENT)
Dept: FAMILY MEDICINE | Facility: CLINIC | Age: 70
End: 2020-04-17

## 2020-04-17 NOTE — TELEPHONE ENCOUNTER
----- Message from Dora Montague sent at 4/17/2020  2:56 PM CDT -----  Type:  RX Refill Request    Who Called:  Inge   Refill or New Rx:  New  RX Name and Strength:  Potassium cl 20 meq ER tabs  How is the patient currently taking it? (ex. 1XDay):  One daily  Is this a 30 day or 90 day RX:  30  Preferred Pharmacy with phone number:    [facility][id number] - [place] - [address] [facility] [address]  [place] [place] [address]  Phone: [phone] Fax: [phone]  Local or Mail Order:  local  Ordering Provider:  Dr Susan Marshall Call Back Number:  [phone]  Additional Information:  She said she has cramps in her ankles and up her legs.  Thank you!

## 2020-04-24 DIAGNOSIS — Z00.00 ROUTINE MEDICAL EXAM: Primary | ICD-10-CM

## 2020-04-28 ENCOUNTER — CLINICAL SUPPORT (OUTPATIENT)
Dept: URGENT CARE | Facility: CLINIC | Age: 70
End: 2020-04-28
Payer: MEDICARE

## 2020-04-28 DIAGNOSIS — Z00.00 ROUTINE MEDICAL EXAM: ICD-10-CM

## 2020-04-28 PROCEDURE — U0002 COVID-19 LAB TEST NON-CDC: HCPCS

## 2020-04-29 LAB — SARS-COV-2 RNA RESP QL NAA+PROBE: NOT DETECTED

## 2020-04-30 ENCOUNTER — NURSE TRIAGE (OUTPATIENT)
Dept: ADMINISTRATIVE | Facility: CLINIC | Age: 70
End: 2020-04-30

## 2020-05-01 ENCOUNTER — PATIENT MESSAGE (OUTPATIENT)
Dept: CARDIOLOGY | Facility: CLINIC | Age: 70
End: 2020-05-01

## 2020-05-01 NOTE — TELEPHONE ENCOUNTER
Pt wants me to let Dr Zelalem Sneed know she will have to cancel appt for tomorrow morning because she does not feel well, she declines triage and would like for someone to call her back in the morning to reschedule, advised her to call back with any needs or concerns, caller agreed     Reason for Disposition   Requesting regular office appointment    Protocols used: INFORMATION ONLY CALL-A-AH

## 2020-05-04 ENCOUNTER — TELEPHONE (OUTPATIENT)
Dept: CARDIOLOGY | Facility: CLINIC | Age: 70
End: 2020-05-04

## 2020-05-04 NOTE — TELEPHONE ENCOUNTER
----- Message from Lorin Shrestha sent at 5/4/2020  1:39 PM CDT -----  Contact: patient  Type: Needs Medical Advice  Who Called:  patient  Best Call Back Number: [phone]  Additional Information: Patient needs to know when she needs to be there for her procedure at 2  On 5/6/20.  Please call to advise/  thanks!

## 2020-05-05 ENCOUNTER — PATIENT MESSAGE (OUTPATIENT)
Dept: ADMINISTRATIVE | Facility: HOSPITAL | Age: 70
End: 2020-05-05

## 2020-05-06 PROBLEM — Z45.02 ICD (IMPLANTABLE CARDIOVERTER-DEFIBRILLATOR) BATTERY DEPLETION: Status: ACTIVE | Noted: 2020-05-06

## 2020-05-06 PROBLEM — R55 SYNCOPE AND COLLAPSE: Status: ACTIVE | Noted: 2020-05-06

## 2020-05-20 ENCOUNTER — TELEPHONE (OUTPATIENT)
Dept: CARDIOLOGY | Facility: CLINIC | Age: 70
End: 2020-05-20
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
## 2020-05-21 ENCOUNTER — CLINICAL SUPPORT (OUTPATIENT)
Dept: CARDIOLOGY | Facility: CLINIC | Age: 70
End: 2020-05-21
Attending: INTERNAL MEDICINE
Payer: MEDICARE

## 2020-05-21 ENCOUNTER — TELEPHONE (OUTPATIENT)
Dept: CARDIOLOGY | Facility: CLINIC | Age: 70
End: 2020-05-21

## 2020-05-21 DIAGNOSIS — I42.2 HYPERTROPHIC CARDIOMYOPATHY: ICD-10-CM

## 2020-05-21 DIAGNOSIS — Z95.810 AUTOMATIC IMPLANTABLE CARDIOVERTER-DEFIBRILLATOR IN SITU: ICD-10-CM

## 2020-05-21 DIAGNOSIS — I44.2 AV BLOCK, COMPLETE: ICD-10-CM

## 2020-05-21 PROCEDURE — 93283 CARDIAC DEVICE CHECK - IN CLINIC & HOSPITAL: ICD-10-PCS | Mod: 26,S$PBB,, | Performed by: INTERNAL MEDICINE

## 2020-05-21 PROCEDURE — 93283 PRGRMG EVAL IMPLANTABLE DFB: CPT | Mod: PBBFAC,PO | Performed by: INTERNAL MEDICINE

## 2020-05-21 NOTE — TELEPHONE ENCOUNTER
Pt called right before 1 pm today and stated she thought her appt was later then 1 pm.  I told that is ok and we can see her at 2 pm today.  Pt asked what time it was and I told her almost 1 pm.  She said ok, she will be here for 2pm

## 2020-06-08 ENCOUNTER — PATIENT OUTREACH (OUTPATIENT)
Dept: ADMINISTRATIVE | Facility: HOSPITAL | Age: 70
End: 2020-06-08

## 2020-06-08 DIAGNOSIS — Z12.31 ENCOUNTER FOR SCREENING MAMMOGRAM FOR MALIGNANT NEOPLASM OF BREAST: Primary | ICD-10-CM

## 2020-06-08 NOTE — PROGRESS NOTES
Chart review completed 06/08/2020.  Care Everywhere updates requested and reviewed.  Immunizations reconciled. Media reviewed.     DIS, Lab denton, and quest reviewed

## 2020-06-12 DIAGNOSIS — G43.909 MIGRAINE WITHOUT STATUS MIGRAINOSUS, NOT INTRACTABLE, UNSPECIFIED MIGRAINE TYPE: ICD-10-CM

## 2020-06-12 RX ORDER — BUTALBITAL, ACETAMINOPHEN AND CAFFEINE 50; 325; 40 MG/1; MG/1; MG/1
1 TABLET ORAL EVERY 6 HOURS PRN
Qty: 120 TABLET | Refills: 2 | Status: SHIPPED | OUTPATIENT
Start: 2020-06-12 | End: 2020-08-04

## 2020-06-12 NOTE — TELEPHONE ENCOUNTER
----- Message from Sebastian Nelson sent at 6/12/2020  2:27 PM CDT -----  Contact: same  Type:  RX Refill Request    Who Called:  patient  Refill or New Rx:  new  RX Name and Strength: butalbital-acetaminophen-caffeine -40 mg (FIORICET, ESGIC) -40 mg per tablet  How is the patient currently taking it? (ex. 1XDay):  Take 1 tablet by mouth every 6 (six) hours as needed. - Oral  Is this a 30 day or 90 day RX:  120 tablet 2 3/23/2020   Preferred Pharmacy with phone number:    [facility] #[id number] - [place] - [address] AT [facility] [address]  [place] [address]  Phone: [phone] Fax: [phone]  Ordering Provider:  Susan Marshall Call Back Number:  [phone]  Additional Information:  n/a

## 2020-06-15 ENCOUNTER — PATIENT OUTREACH (OUTPATIENT)
Dept: ADMINISTRATIVE | Facility: HOSPITAL | Age: 70
End: 2020-06-15

## 2020-07-07 ENCOUNTER — TELEPHONE (OUTPATIENT)
Dept: CARDIOLOGY | Facility: CLINIC | Age: 70
End: 2020-07-07

## 2020-07-07 DIAGNOSIS — I42.2 HYPERTROPHIC CARDIOMYOPATHY: ICD-10-CM

## 2020-07-07 DIAGNOSIS — Z95.810 AUTOMATIC IMPLANTABLE CARDIOVERTER-DEFIBRILLATOR IN SITU: Primary | ICD-10-CM

## 2020-07-07 NOTE — TELEPHONE ENCOUNTER
Received the following alert from [employer]:    ICD report  Last transmission 6/7/20    1 VT-1 episode on 7/6/20 at a rate of 173 bpm lasting 12 sec in monitor zone. EGM illustrates 30 beat VT.    53 AMS episodes with 1.5% burden. Longest 1 hour 16 min on 7/4/20 with peak V rate of 95 bpm. EGM illustrates Atach/AFlutter. OAC status unknown.      100% .    Called pt., pt. Asymptomatic. Pt. Not on OAC. Discussed with MD. Per MD schedule BMP and Mag. Notified pt. Of plan of care, pt. Verbalized understanding and labs scheduled today

## 2020-07-29 NOTE — TELEPHONE ENCOUNTER
Care Due:                  Date            Visit Type   Department     Provider  --------------------------------------------------------------------------------                                ESTABLISHED                              PATIENT      [facility] FAM[facility]  Last Visit: 09-      SHORT        MEDICINE       DAIJA MCMILLAN                              ESTABLISHED   [facility]  Next Visit: 08-      PATIENT      MEDICINE       DAIJA MCMILLAN                                                            Last  Test          Frequency    Reason                     Performed    Due Date  --------------------------------------------------------------------------------    ALT.........  12 months..  fenofibric, pravastatin..  07- 07-    AST.........  12 months..  fenofibric, pravastatin..  07- 07-    HBA1C.......  6 months...  metFORMIN................  07- 01-    HDL.........  12 months..  fenofibric, pravastatin..  03- 03-    LDL.........  12 months..  fenofibric, pravastatin..  03- 03-    Total         12 months..  fenofibric, pravastatin..  03- 03-  Cholesterol.    Triglyceride  12 months..  fenofibric, pravastatin..  03- 03-  s...........    Powered by [employer]. Reference number: [id number]. 7/29/2020 6:17:46 PM CDT

## 2020-07-30 NOTE — TELEPHONE ENCOUNTER
No new care gaps identified.  Powered by [employer]. Reference number: [id number]. 7/30/2020 4:51:21 PM CDT

## 2020-07-31 RX ORDER — RAMIPRIL 10 MG/1
CAPSULE ORAL
Qty: 90 CAPSULE | Refills: 3 | OUTPATIENT
Start: 2020-07-31

## 2020-07-31 RX ORDER — RAMIPRIL 10 MG/1
CAPSULE ORAL
Qty: 90 CAPSULE | Refills: 0 | Status: SHIPPED | OUTPATIENT
Start: 2020-07-31

## 2020-07-31 NOTE — TELEPHONE ENCOUNTER
----- Message from Aurora Joy MA sent at 7/31/2020  8:58 AM CDT -----  Type:  RX Refill Request    Who Called:  Inge  Refill or New Rx:  refill  RX Name and Strength:    RABEprazole (ACIPHEX) 20 mg tablet 90 tablet 0 1/31/2020    Sig - Route: Take 1 tablet (20 mg total) by mouth once daily. - Oral     ramipriL (ALTACE) 10 MG capsule 90 capsule 0 7/31/2020    Sig: TAKE ONE CAPSULE BY MOUTH EVERY DAY   How is the patient currently taking it? (ex. 1XDay):  See Above  Is this a 30 day or 90 day RX:  90  Preferred Pharmacy with phone number:    [facility] #[id number] - [place] - [address] [address] [facility] [address]  [facility] [place] [address]  Phone: [phone] Fax: [phone]  Local or Mail Order:  local  Ordering Provider:    Best Call Back Number:  [phone]  Additional Information:  patient is out of the altace, she has two doses of the aciphex.  Please call when called in

## 2020-07-31 NOTE — PROGRESS NOTES
Refill Routing Note   Medication(s) are not appropriate for processing by [facility]:       - Required vitals are abnormal  - Patient displays non-adherence to medications (has run out of medication supply over 6 months ago)  - Patient has been hospitalized since the last PCP visit  - Drug-Disease Interaction (DIARRHEA)     Will follow up with your staff to schedule labs after your decision.    Medication-related problems identified:   Non-adherence - intentional  Drug-disease interaction  Requires labs  Medication Therapy Plan: FOVS; PER EPIC DATA 0% ADHERENCE; PT. HAS RUN OUT OF MEDICATON SUPPLY OVER 6 MONTHS AGO; BP-ELEVATED(136/115) AT HOSPITAL VISIT NOT TO [facility] STANDARDS ABNORMAL VITALS, PLEASE ADVISE; HOSPITALIZATION(05/06/2020-Syncope and collapse); DDzi(DIARRHEA); CDMR. NTBS(CMP, LIPID, A1C) PER WOG, DEFER TO YOU  Medication reconciliation completed: No      Automatic Epic Generated Protocol Data:        Requested Prescriptions   Pending Prescriptions Disp Refills    ramipriL (ALTACE) 10 MG capsule [Pharmacy Med Name: RAMIPRIL 10MG CAPSULES] 90 capsule 0     Sig: TAKE ONE CAPSULE BY MOUTH EVERY DAY       Cardiovascular:  ACE Inhibitors Failed - 7/31/2020  6:41 AM        Failed - Last BP in normal range within 360 days.     BP Readings from Last 3 Encounters:   05/06/20 (!) 136/115   09/13/19 118/65   09/04/19 124/84              Passed - Patient is at least 18 years old        Passed - Office visit in past 12 months or future 90 days.     Recent Outpatient Visits            11 months ago Fatigue, unspecified type    [facility] Roverto Davis MD    11 months ago Coronary artery disease involving native coronary artery of native heart without angina pectoris    [facility] Rony Sneed MD    1 year ago Fatigue, unspecified type    [facility] Petra Dunn NP    1 year ago Type 2 diabetes mellitus without complication, without  long-term current use of insulin    [facility] Roverto Davis MD    1 year ago Hypertrophic cardiomyopathy    [facility]          Future Appointments              In 3 weeks Roverto Davis MD [facility]    In 1 month HOME MONITOR DEVICE CHECK, [facility] [place]                Passed - Cr is 1.3 or below and within 360 days     Creatinine   Date Value Ref Range Status   05/06/2020 0.75 0.50 - 1.40 mg/dL Final   07/11/2019 1.1 0.5 - 1.4 mg/dL Final   06/10/2019 1.0 0.5 - 1.4 mg/dL Final              Passed - K in normal range and within 360 days     Potassium   Date Value Ref Range Status   05/06/2020 4.1 3.5 - 5.1 mmol/L Final   07/11/2019 4.3 3.5 - 5.1 mmol/L Final   06/10/2019 4.5 3.5 - 5.1 mmol/L Final              Passed - eGFR within 360 days     eGFR if non    Date Value Ref Range Status   05/06/2020 >60 >60 mL/min/1.73 m^2 Final     Comment:     Calculation used to obtain the estimated glomerular filtration  rate (eGFR) is the CKD-EPI equation.      07/11/2019 51 (A) >60 mL/min/1.73 m^2 Final     Comment:     Calculation used to obtain the estimated glomerular filtration  rate (eGFR) is the CKD-EPI equation.      06/10/2019 57.6 (A) >60 mL/min/1.73 m^2 Final     Comment:     Calculation used to obtain the estimated glomerular filtration  rate (eGFR) is the CKD-EPI equation.        eGFR if    Date Value Ref Range Status   05/06/2020 >60 >60 mL/min/1.73 m^2 Final   07/11/2019 59 (A) >60 mL/min/1.73 m^2 Final   06/10/2019 >60.0 >60 mL/min/1.73 m^2 Final                    Appointments  past 12m or future 3m with PCP    Date Provider   Last Visit   9/4/2019 Roverto Davis MD   Next Visit   7/30/2020 Roverto Davis MD   ED visits in past 90 days: 0     Note composed:6:51 AM 07/31/2020

## 2020-07-31 NOTE — PROGRESS NOTES
Quick DC. Duplicate Request   Refill Authorization Note    is requesting a refill authorization.    Brief assessment and rationale for refill: QUICK DC: DUPLICATE REQUEST          Medication Therapy Plan: DUPLICATE REQUEST NOTED BELOW    Medication reconciliation completed: No                          Comments:   Pended Medication(s)       Requested Prescriptions     Pending Prescriptions Disp Refills    ramipriL (ALTACE) 10 MG capsule [Pharmacy Med Name: RAMIPRIL 10MG CAPSULES] 90 capsule 3     Sig: TAKE ONE CAPSULE BY MOUTH EVERY DAY        Duplicate Pended Encounter(s)/ Last Prescribed Details:    Ordering Encounter Report    Associated Reports   View Encounter                Note composed:6:45 AM 07/31/2020

## 2020-07-31 NOTE — TELEPHONE ENCOUNTER
Provider Staff:     Please schedule patient for Labs (CMP, LIPIDS, A1C)    Please also check with your provider if any further labs need to be added and scheduled together.    Thanks!  [facility]     Appointments  past 12m or future 3m with PCP    Date Provider   Last Visit   9/4/2019 Roverto Davis MD   Next Visit   7/30/2020 Roverto Davis MD     Note composed:2:26 PM 07/31/2020

## 2020-07-31 NOTE — TELEPHONE ENCOUNTER
No new care gaps identified.  Powered by [employer]. Reference number: [id number]. 7/31/2020 9:21:21 AM RICHARDT

## 2020-08-03 ENCOUNTER — TELEPHONE (OUTPATIENT)
Dept: FAMILY MEDICINE | Facility: CLINIC | Age: 70
End: 2020-08-03

## 2020-08-03 RX ORDER — OMEPRAZOLE 20 MG/1
20 CAPSULE, DELAYED RELEASE ORAL DAILY
Qty: 90 CAPSULE | Refills: 3 | Status: SHIPPED | OUTPATIENT
Start: 2020-08-03 | End: 2021-08-03

## 2020-08-03 RX ORDER — RABEPRAZOLE SODIUM 20 MG/1
20 TABLET, DELAYED RELEASE ORAL DAILY
Qty: 90 TABLET | Refills: 0 | OUTPATIENT
Start: 2020-08-03

## 2020-08-03 NOTE — TELEPHONE ENCOUNTER
----- Message from Chano Sandoval sent at 8/1/2020 11:42 AM CDT -----  Regarding: advice  Contact: patient  Patient want to inform office her insurance don't cover RABEprazole (ACIPHEX) 20 mg, patient will like to speak with a nurse regarding advice on what to do please call back at [phone] (home)     Case number [id number]

## 2020-08-03 NOTE — PROGRESS NOTES
Refill Routing Note   Medication(s) are not appropriate for processing by [facility]:       - Patient has been hospitalized since the last PCP visit  - Required indication for medication not on problem list (GERD)     Medication-related problems identified: Non-adherence (knowledge deficit) non-intentional  Medication Therapy Plan: RAMIPRIL HANDELED IN A PREVIOUS ENCOUNTER; HOSPITALIZATION(5/6/20-Syncope and collapse); PER EPIC DATA 51% ADHERENCE; GERD NOT ON PROBLEM LIST; CDMR. (FOVS), DEFER TO YOU         Automatic Epic Generated Protocol Data:        Requested Prescriptions   Pending Prescriptions Disp Refills    RABEprazole (ACIPHEX) 20 mg tablet 90 tablet 0     Sig: Take 1 tablet (20 mg total) by mouth once daily.       Gastroenterology: Proton Pump Inhibitors 2 Failed - 7/31/2020  9:20 AM        Failed - An appropriate indication is on the problem list        Passed - Patient is at least 18 years old        Passed - Osteoporosis is not on problem list        Passed - Office visit in past 6 months or future 90 days.     Recent Outpatient Visits            11 months ago Fatigue, unspecified type    [facility] Roverto Davis MD    11 months ago Coronary artery disease involving native coronary artery of native heart without angina pectoris    [facility] Rony Sneed MD    1 year ago Fatigue, unspecified type    [facility] Petra Dunn NP    1 year ago Type 2 diabetes mellitus without complication, without long-term current use of insulin    [facility] Roverto Davis MD    1 year ago Hypertrophic cardiomyopathy    [facility]          Future Appointments              In 3 weeks Roverto Davis MD [facility]    In 1 month [facility]                      Appointments  past 12m or future 3m with PCP     Date Provider   Last Visit   9/4/2019 Roverto Davis MD   Next Visit   8/3/2020 Roverto Davis MD   ED visits in past 90 days: 0     Note composed:11:59 AM 08/03/2020

## 2020-08-03 NOTE — TELEPHONE ENCOUNTER
Spoke with patient. She is requesting butalbital without caffeine. She stated her [facility] has it. Can she get a Rx for it? Please advise

## 2020-08-03 NOTE — TELEPHONE ENCOUNTER
Provider Staff:     Please note denial of medication.     Refused by: Roverto Davis MD  Refusal reason: Refill not appropriate    Thanks!  [facility]     Note composed: 08/03/2020 2:51 PM

## 2020-08-10 ENCOUNTER — PATIENT OUTREACH (OUTPATIENT)
Dept: ADMINISTRATIVE | Facility: HOSPITAL | Age: 70
End: 2020-08-10

## 2020-08-10 DIAGNOSIS — E11.9 DIABETES MELLITUS WITHOUT COMPLICATION: Primary | ICD-10-CM

## 2020-08-10 NOTE — PROGRESS NOTES
Chart review completed 08/10/2020.  Care Everywhere updates requested and reviewed.  Immunizations reconciled. Media reports reviewed.  Duplicate HM overrides and  orders removed.  Overdue HM topic chart audit and/or requested.  Overdue lab testing linked to upcoming lab appointments if applies.    Lab denton, and quest reviewed   Pap Smear and Lab testing       DIS reviewed      Mammogram and DEXA    Links down 08/10/2020        Health Maintenance Due   Topic Date Due    Aspirin/Antiplatelet Therapy  1968    High Dose Statin  1971    Shingles Vaccine (2 of 3) 2015    Eye Exam  2019    Hemoglobin A1c  2020    Mammogram  2020    LDCT Lung Screen  2020    Lipid Panel  2020    Foot Exam  2020

## 2020-08-21 ENCOUNTER — DOCUMENTATION ONLY (OUTPATIENT)
Dept: CARDIOLOGY | Facility: CLINIC | Age: 70
End: 2020-08-21

## 2020-08-21 NOTE — PROGRESS NOTES
Received an alert for HV lead impedance greater than upper limit, currently 58 ohms.     Reviewed w/ Aziza soriano/ GEETA and Dr. Sneed, recommendations Continue to monitor

## 2020-09-03 DIAGNOSIS — E11.9 TYPE 2 DIABETES MELLITUS WITHOUT COMPLICATION: ICD-10-CM

## 2020-09-05 NOTE — TELEPHONE ENCOUNTER
Care Due:                  Date            Visit Type   Department     Provider  --------------------------------------------------------------------------------                                ESTABLISHED                              PATIENT      [facility] FAMILY  Last Visit: 09-      [facility]       DAIJA MCMILLAN  Next Visit: None Scheduled  None         None Found                                                            Last  Test          Frequency    Reason                     Performed    Due Date  --------------------------------------------------------------------------------    Office Visit  12 months..  fenofibric, fluticasone,   09- 08-                             metFORMIN, omeprazole,                             pravastatin, ramipriL....    Powered by [employer]. Reference number: [id number]. 9/05/2020 4:30:07 PM CDT

## 2020-09-08 RX ORDER — SITAGLIPTIN AND METFORMIN HYDROCHLORIDE 500; 50 MG/1; MG/1
TABLET, FILM COATED ORAL
Qty: 180 TABLET | Refills: 3 | Status: SHIPPED | OUTPATIENT
Start: 2020-09-08

## 2020-09-08 NOTE — PROGRESS NOTES
Refill Routing Note   Medication(s) are not appropriate for processing by [facility]:       - Patient displays non-adherence to medications (has run out of medication supply over 6 months ago)     Medication-related problems identified:   Requires appointment  Non-adherence - intentional  Medication Therapy Plan: CDMR. pt has not been picking up medication routinely; med adherence is 43%; please advise; defer   Medication reconciliation completed: No      Automatic Epic Generated Protocol Data:        Requested Prescriptions   Pending Prescriptions Disp Refills    JANUMET  mg per tablet [Pharmacy Med Name: JANUMET 50/500MG TABLETS] 180 tablet 0     Sig: TAKE 1 TABLET BY MOUTH TWICE DAILY       Endocrinology:  Diabetes - Biguanide + DPP-4 Inhibitor Combos Failed - 9/5/2020  4:29 PM        Failed - Office visit in past 12 months or future 90 days.     Recent Outpatient Visits            1 year ago Fatigue, unspecified type    [facility] Roverto Davis MD    1 year ago Coronary artery disease involving native coronary artery of native heart without angina pectoris    [facility] Rony Sneed MD    1 year ago Fatigue, unspecified type    [facility] Petra Dunn NP    1 year ago Type 2 diabetes mellitus without complication, without long-term current use of insulin    [facility] Roverto Davis MD    1 year ago Hypertrophic cardiomyopathy    [facility]                    Failed - HBA1C is 7.9 or below and within 180 days     Hemoglobin A1C   Date Value Ref Range Status   07/11/2019 7.1 (H) 4.0 - 5.6 % Final     Comment:     ADA Screening Guidelines:  5.7-6.4%  Consistent with prediabetes  >or=6.5%  Consistent with diabetes  High levels of fetal hemoglobin interfere with the HbA1C  assay. Heterozygous hemoglobin variants (HbS, HgC, etc)do  not significantly interfere with this assay.    However, presence of multiple variants may affect accuracy.     03/26/2019 6.9 (H) 4.0 - 5.6 % Final     Comment:     ADA Screening Guidelines:  5.7-6.4%  Consistent with prediabetes  >or=6.5%  Consistent with diabetes  High levels of fetal hemoglobin interfere with the HbA1C  assay. Heterozygous hemoglobin variants (HbS, HgC, etc)do  not significantly interfere with this assay.   However, presence of multiple variants may affect accuracy.     03/19/2019 6.9 (H) 0.0 - 5.6 % Final     Comment:     Reference Interval:  5.0 - 5.6 Normal   5.7 - 6.4 High Risk   > 6.5 Diabetic    Hgb A1c results are standardized based on the (NGSP) National   Glycohemoglobin Standardization Program.    Hemoglobin A1C levels are related to mean serum/plasma glucose   during the preceding 2-3 months.                   Passed - Patient is at least 18 years old        Passed - Cr is 1.3 or below and within 360 days     Creatinine   Date Value Ref Range Status   05/06/2020 0.75 0.50 - 1.40 mg/dL Final   07/11/2019 1.1 0.5 - 1.4 mg/dL Final   06/10/2019 1.0 0.5 - 1.4 mg/dL Final              Passed - eGFR is 30 or above and within 360 days     eGFR if non    Date Value Ref Range Status   05/06/2020 >60 >60 mL/min/1.73 m^2 Final     Comment:     Calculation used to obtain the estimated glomerular filtration  rate (eGFR) is the CKD-EPI equation.      07/11/2019 51 (A) >60 mL/min/1.73 m^2 Final     Comment:     Calculation used to obtain the estimated glomerular filtration  rate (eGFR) is the CKD-EPI equation.      06/10/2019 57.6 (A) >60 mL/min/1.73 m^2 Final     Comment:     Calculation used to obtain the estimated glomerular filtration  rate (eGFR) is the CKD-EPI equation.        eGFR if    Date Value Ref Range Status   05/06/2020 >60 >60 mL/min/1.73 m^2 Final   07/11/2019 59 (A) >60 mL/min/1.73 m^2 Final   06/10/2019 >60.0 >60 mL/min/1.73 m^2 Final                    Appointments  past 12m or future 3m with  PCP    Date Provider   Last Visit   9/4/2019 Roverto Davis MD   Next Visit   Visit date not found Roverto Davis MD   ED visits in past 90 days: 0     Note composed:1:26 PM 09/08/2020

## 2020-09-10 ENCOUNTER — CLINICAL SUPPORT (OUTPATIENT)
Dept: CARDIOLOGY | Facility: CLINIC | Age: 70
End: 2020-09-10
Payer: MEDICARE

## 2020-09-10 DIAGNOSIS — Z95.810 PRESENCE OF AUTOMATIC (IMPLANTABLE) CARDIAC DEFIBRILLATOR: ICD-10-CM

## 2020-09-10 PROCEDURE — 93295 DEV INTERROG REMOTE 1/2/MLT: CPT | Mod: ,,, | Performed by: INTERNAL MEDICINE

## 2020-09-10 PROCEDURE — 93295 CARDIAC DEVICE CHECK - REMOTE: ICD-10-PCS | Mod: ,,, | Performed by: INTERNAL MEDICINE

## 2020-09-10 PROCEDURE — 93296 REM INTERROG EVL PM/IDS: CPT | Mod: PBBFAC,PO | Performed by: INTERNAL MEDICINE

## 2020-09-11 ENCOUNTER — TELEPHONE (OUTPATIENT)
Dept: FAMILY MEDICINE | Facility: CLINIC | Age: 70
End: 2020-09-11

## 2020-09-11 NOTE — TELEPHONE ENCOUNTER
----- Message from Fariba Swain sent at 9/11/2020 11:08 AM CDT -----  Contact: self [phone]  Pt states the diabetic medication costs $1100. Pt states the name of the medication is JANUMET  mg per tablet and has been costing her $137 with 2 insurances. Please call and advise.

## 2020-09-13 NOTE — TELEPHONE ENCOUNTER
Have her get her labs as ordered and follow-up with me as planned. Once I look over the labs, we can discuss a cheaper plan at her appointment.

## 2020-09-14 ENCOUNTER — PATIENT OUTREACH (OUTPATIENT)
Dept: ADMINISTRATIVE | Facility: HOSPITAL | Age: 70
End: 2020-09-14

## 2020-09-14 NOTE — PROGRESS NOTES
Non-compliant A1C report.    Non-compliant report chart audits. Chart review completed for HM test overdue (mammograms, Colonoscopies, pap smears, DM labs, and/or EYE EXAMs)  06/18/2020    Care Everywhere and media, updates requested and reviewed.      [facility] and [facility] reviewed.    DIS reviewed for test needed.    Outreach to patient.  Advised her that she needed labs drawn and mammogram scheduled.    Scheduled labs and mammogram for 10/1/20.

## 2020-09-21 ENCOUNTER — PATIENT OUTREACH (OUTPATIENT)
Dept: ADMINISTRATIVE | Facility: HOSPITAL | Age: 70
End: 2020-09-21

## 2020-09-21 NOTE — PROGRESS NOTES
Chart review completed 2020.  Care Everywhere updates requested and reviewed.  Immunizations reconciled. Media reports reviewed.  Duplicate HM overrides and  orders removed.  Overdue HM topic chart audit and/or requested.  Overdue lab testing linked to upcoming lab appointments if applies.    Future Appointments   Date Time Provider Department Center   10/1/2020  8:50 AM LAB, [facility] LAB [place]   10/1/2020  9:30 AM [facility] MAMMO2 [facility] MAMMO [place]   10/5/2020  2:40 PM Roverto Davis MD [facility] FAM MED [place]   2020 10:00 AM HOME MONITOR DEVICE CHECK, [facility] CARDIO [place]         Health Maintenance Due   Topic Date Due    Aspirin/Antiplatelet Therapy  1968    High Dose Statin  1971    Colorectal Cancer Screening  2015    Shingles Vaccine (2 of 3) 2015    Eye Exam  2019    Hemoglobin A1c  2020    Mammogram  2020    LDCT Lung Screen  2020    Lipid Panel  2020    Foot Exam  2020    Influenza Vaccine (1) 2020

## 2020-09-21 NOTE — LETTER
October 1, 2020    Inge Monge  [address] [address] [place] [address]             [facility]  [address]  [facility] [address]  Phone: [phone] [facility] to your overall [facility].  To help you get the most out of each of your visits, we will review your information to make sure you are up to date on all of your recommended tests and/or procedures.       Roverto Davis MD  has found that your chart shows you may be due for the following:     Colon cancer screening   Annual Dilated Eye Exam   Diabetic Foot Exam     If you have had any of the above done at another facility, please bring the records with you or fax them to [phone] so that your record at [facility] will be complete. If you have not had any of these tests or procedures done recently and would like to complete this testing ,  please call [phone] or send a message through your [facility] portal to your provider's office.     If you have an upcoming scheduled appointment for the above test and/or procedures, please disregard this letter.     If you are currently taking medication, please bring it with you to your appointment for review.     Sincerely,     Your [facility] Care[facility]eyda, Care Coordinator   [facility]   Phone: [phone]   Fax: [phone]

## 2020-09-29 ENCOUNTER — PATIENT MESSAGE (OUTPATIENT)
Dept: OTHER | Facility: OTHER | Age: 70
End: 2020-09-29

## 2020-10-05 ENCOUNTER — PATIENT OUTREACH (OUTPATIENT)
Dept: ADMINISTRATIVE | Facility: HOSPITAL | Age: 70
End: 2020-10-05

## 2020-10-05 ENCOUNTER — PATIENT MESSAGE (OUTPATIENT)
Dept: ADMINISTRATIVE | Facility: HOSPITAL | Age: 70
End: 2020-10-05

## 2020-10-06 ENCOUNTER — TELEPHONE (OUTPATIENT)
Dept: FAMILY MEDICINE | Facility: CLINIC | Age: 70
End: 2020-10-06

## 2020-10-06 NOTE — TELEPHONE ENCOUNTER
----- Message from Dana Holman sent at 10/5/2020  2:40 PM CDT -----  Type: Needs Medical Advice  Who Called:  Patient  Best Call Back Number: [phone]  Additional Information: Patient lost track of time and will not make her 2:40 appt. Rescheduled for tomorrow.

## 2020-10-12 ENCOUNTER — TELEPHONE (OUTPATIENT)
Dept: CARDIOLOGY | Facility: CLINIC | Age: 70
End: 2020-10-12

## 2020-10-12 DIAGNOSIS — G43.909 MIGRAINE WITHOUT STATUS MIGRAINOSUS, NOT INTRACTABLE, UNSPECIFIED MIGRAINE TYPE: ICD-10-CM

## 2020-10-12 RX ORDER — BUTALBITAL, ACETAMINOPHEN AND CAFFEINE 50; 325; 40 MG/1; MG/1; MG/1
TABLET ORAL
Qty: 120 TABLET | Refills: 2 | Status: SHIPPED | OUTPATIENT
Start: 2020-10-12

## 2020-10-12 NOTE — TELEPHONE ENCOUNTER
AT/AFl noted during device interrogation: 10/8/2020    Overall burden:  0%    Max duration seen: 26 mins. 26 secs.     Most recent episode: 9/25/2020    Ventricular rates:   Controlled    Anticoagulation status:  None    1 Non-Sustained event on 09/17/20, 165bpm, 8sec in duration, EGM illustrates run of nsVT      See attached for interrogation    Message forwarded to Dr. Sneed for review

## 2020-10-12 NOTE — PROGRESS NOTES
Refill Routing Note   Medication(s) are not appropriate for processing by [facility] for the following reason(s):     - Outside of protocol    [facility] actions taken in this encounter: Route          Medication reconciliation completed: No   Automatic Epic Generated Protocol Data:        Requested Prescriptions   Pending Prescriptions Disp Refills    butalbital-acetaminophen-caffeine -40 mg (FIORICET, ESGIC) -40 mg per tablet [Pharmacy Med Name: BUTALBITAL/ACETAMINOPHEN/CAFF TABS] 120 tablet 2     Sig: TAKE 1 TABLET BY MOUTH EVERY 6 HOURS AS NEEDED       Narcotics Refill Protocol Failed - 10/12/2020  9:32 AM        Failed - Patient seen within 3 months     Last visit with Roverto Davis MD: 9/4/2019  Last visit in [facility]: Visit date not found    Patient's next visit in [facility]: 10/28/2020           Passed - Patient not pregnant        Passed - Med not refilled within 4 weeks              Appointments  past 12m or future 3m with PCP    Date Provider   Last Visit   9/4/2019 Roverto Davis MD   Next Visit   10/28/2020 Roverto Davis MD   ED visits in past 90 days: 0        Note composed:9:35 AM 10/12/2020

## 2020-10-14 ENCOUNTER — PATIENT OUTREACH (OUTPATIENT)
Dept: ADMINISTRATIVE | Facility: HOSPITAL | Age: 70
End: 2020-10-14

## 2020-10-14 NOTE — PROGRESS NOTES
Chart review completed 10/14/2020.  Care Everywhere updates requested and reviewed.  Immunizations reconciled. Media reports reviewed.  Duplicate HM overrides and  orders removed.  Overdue HM topic chart audit and/or requested.  Overdue lab testing linked to upcoming lab appointments if applies.        Health Maintenance Due   Topic Date Due    Aspirin/Antiplatelet Therapy  1968    High Dose Statin  1971    Colorectal Cancer Screening  2015    Shingles Vaccine (2 of 3) 2015    Eye Exam  2019    Hemoglobin A1c  2020    Mammogram  2020    LDCT Lung Screen  2020    Lipid Panel  2020    Foot Exam  2020    Influenza Vaccine (1) 2020          Declined

## 2020-10-27 ENCOUNTER — PATIENT OUTREACH (OUTPATIENT)
Dept: ADMINISTRATIVE | Facility: HOSPITAL | Age: 70
End: 2020-10-27

## 2020-10-28 ENCOUNTER — PATIENT OUTREACH (OUTPATIENT)
Dept: ADMINISTRATIVE | Facility: HOSPITAL | Age: 70
End: 2020-10-28

## 2020-10-28 NOTE — LETTER
November 5, 2020    Inge Monge  [address] [address] [facility] [place] [address]             [facility]  [address]  [facility] [address]  Phone: [phone] Dear [facility] committed to your overall [facility].  To help you get the most out of each of your visits, we will review your information to make sure you are up to date on all of your recommended tests and/or procedures.       Roverto Davis MD  has found that your chart shows you may be due for the following:     Influenza Vaccine   Colon cancer screening   Shingles Immunization   Annual Dilated Eye Exam   Diabetic lab testing   Mammogram     If you have had any of the above done at another facility, please bring the records with you or fax them to [phone] so that your record at [facility] will be complete. If you have not had any of these tests or procedures done recently and would like to complete this testing ,  please call [phone] or send a message through your [facility] portal to your provider's office.     If you have an upcoming scheduled appointment for the above test and/or procedures, please disregard this letter.     If you are currently taking medication, please bring it with you to your appointment for review.     Sincerely,     Your [facility] Care[facility]eyda porter, Care Coordinator   [facility]   Phone: [phone]

## 2020-10-29 DIAGNOSIS — E11.69 TYPE 2 DIABETES MELLITUS WITH OTHER SPECIFIED COMPLICATION, UNSPECIFIED WHETHER LONG TERM INSULIN USE: ICD-10-CM

## 2020-10-29 DIAGNOSIS — I10 ESSENTIAL HYPERTENSION: Primary | ICD-10-CM

## 2020-10-29 NOTE — TELEPHONE ENCOUNTER
----- Message from Paulo May sent at 10/29/2020  3:17 PM CDT -----  Contact: pt at [phone]  Type:  RX Refill Request  Who Called: pt  Refill or New Rx:  refill  RX Name and Strength:  fenofibric acid (FIBRICOR) 135 mg CpDR  How is the patient currently taking it? (ex. 1XDay):  1XDay  Is this a 30 day or 90 day RX:  90  Preferred Pharmacy with phone number:    [id number] - [place] - [address] [facility] [address]  [address]  [place] [address]  Phone: [phone] Fax: [phone]  Local or Mail Order:  Local  Ordering Provider:  Susan Marshall Call Back Number:  [phone]

## 2020-10-29 NOTE — TELEPHONE ENCOUNTER
Care Due:                  Date            Visit Type   Department     Provider  --------------------------------------------------------------------------------                                ESTABLISHED                              PATIENT      [facility]  Last Visit: 09-      [facility]        FEDERICO MCMILLAN                                           [facility]  Next Visit: 11-      [facility]       DAIJA MCMILLAN                                                            Last  Test          Frequency    Reason                     Performed    Due Date  --------------------------------------------------------------------------------    CMP.........  12 months..  fenofibric, pravastatin..  07- 07-    HBA1C.......  6 months...  JANUMET, metFORMIN.......  07- 01-    Lipid Panel.  12 months..  fenofibric, pravastatin..  03- 03-    Powered by [employer]. Reference number: [id number]. 10/29/2020 3:23:21 PM   CDT

## 2020-10-30 RX ORDER — FENOFIBRIC ACID 135 MG/1
1 CAPSULE, DELAYED RELEASE ORAL DAILY
Qty: 90 CAPSULE | Refills: 0 | Status: SHIPPED | OUTPATIENT
Start: 2020-10-30

## 2020-10-30 NOTE — PROGRESS NOTES
Refill Authorization Note   Inge Monge is requesting a refill authorization.  Brief assessment and rationale for refill: Approve    -Medication-related problems identified: Requires labs  Medication Therapy Plan: CDMR.  Labs (CMP/lipids/a1c)    Medication reconciliation completed: No   Comments:   Orders Placed This Encounter    fenofibric acid (FIBRICOR) 135 mg CpDR      Requested Prescriptions   Signed Prescriptions Disp Refills    fenofibric acid (FIBRICOR) 135 mg CpDR 90 capsule 0     Sig: Take 1 capsule (135 mg total) by mouth once daily.       Cardiovascular:  Antilipid - Fibric Acid Derivatives Failed - 10/29/2020  3:22 PM        Failed - ALT is 94 or below and within 360 days     ALT   Date Value Ref Range Status   07/11/2019 25 10 - 44 U/L Final   03/26/2019 15 10 - 44 U/L Final   03/19/2019 17 10 - 44 U/L Final              Failed - AST is 54 or below and within 360 days     AST   Date Value Ref Range Status   07/11/2019 24 10 - 40 U/L Final   03/26/2019 20 10 - 40 U/L Final   03/19/2019 22 14 - 36 U/L Final              Failed - Total Cholesterol within 360 days     Cholesterol   Date Value Ref Range Status   03/26/2019 208 (H) 120 - 199 mg/dL Final     Comment:     The National Cholesterol Education Program (NCEP) has set the  following guidelines (reference ranges) for Cholesterol:  Optimal.....................<200 mg/dL  Borderline High.............200-239 mg/dL  High........................> or = 240 mg/dL     03/21/2019 135 120 - 199 mg/dL Final     Comment:     The National Cholesterol Education Program (NCEP) has set the  following guidelines (reference ranges) for Cholesterol:  Optimal.....................<200 mg/dL  Borderline High.............200-239 mg/dL  High........................> or = 240 mg/dL     02/08/2018 210 (H) 120 - 199 mg/dL Final     Comment:     The National Cholesterol Education Program (NCEP) has set the  following guidelines (reference ranges) for  Cholesterol:  Optimal.....................<200 mg/dL  Borderline High.............200-239 mg/dL  High........................> or = 240 mg/dL                Failed - LDL within 360 days     LDL Cholesterol   Date Value Ref Range Status   03/26/2019 104.8 63.0 - 159.0 mg/dL Final     Comment:     The National Cholesterol Education Program (NCEP) has set the  following guidelines (reference values) for LDL Cholesterol:  Optimal.......................<130 mg/dL  Borderline High...............130-159 mg/dL  High..........................160-189 mg/dL  Very High.....................>190 mg/dL              Failed - HDL within 360 days     HDL   Date Value Ref Range Status   03/26/2019 38 (L) 40 - 75 mg/dL Final     Comment:     The National Cholesterol Education Program (NCEP) has set the  following guidelines (reference values) for HDL Cholesterol:  Low...............<40 mg/dL  Optimal...........>60 mg/dL              Failed - Triglycerides within 360 days     Triglycerides   Date Value Ref Range Status   03/26/2019 326 (H) 30 - 150 mg/dL Final     Comment:     The National Cholesterol Education Program (NCEP) has set the  following guidelines (reference values) for triglycerides:  Normal......................<150 mg/dL  Borderline High.............150-199 mg/dL  High........................200-499 mg/dL     03/21/2019 239 (H) 30 - 150 mg/dL Final     Comment:     The National Cholesterol Education Program (NCEP) has set the  following guidelines (reference values) for triglycerides:  Normal......................<150 mg/dL  Borderline High.............150-199 mg/dL  High........................200-499 mg/dL     02/08/2018 542 (H) 30 - 150 mg/dL Final     Comment:     The National Cholesterol Education Program (NCEP) has set the  following guidelines (reference values) for triglycerides:  Normal......................<150 mg/dL  Borderline High.............150-199 mg/dL  High........................200-499 mg/dL                 Passed - Patient is at least 18 years old        Passed - Office visit in past 12 months or future 90 days       Recent Outpatient Visits            1 year ago Fatigue, unspecified type    [facility] Roverto Davis MD    1 year ago Coronary artery disease involving native coronary artery of native heart without angina pectoris    [facility] Rony Sneed MD    1 year ago Fatigue, unspecified type    [facility] Petra Dunn NP    1 year ago Type 2 diabetes mellitus without complication, without long-term current use of insulin    [facility] Roverto Davis MD    1 year ago Hypertrophic cardiomyopathy    [facility]            Future Appointments              In 1 week MD [facility], [place]    [facility]    In 1 week [facility]    In 1 month [facility]                Passed - Cr is 1.4 or below and within 360 days     Creatinine   Date Value Ref Range Status   05/06/2020 0.75 0.50 - 1.40 mg/dL Final   07/11/2019 1.1 0.5 - 1.4 mg/dL Final   06/10/2019 1.0 0.5 - 1.4 mg/dL Final              Passed - eGFR is 30 or above and within 360 days     eGFR if non    Date Value Ref Range Status   05/06/2020 >60 >60 mL/min/1.73 m^2 Final     Comment:     Calculation used to obtain the estimated glomerular filtration  rate (eGFR) is the CKD-EPI equation.      07/11/2019 51 (A) >60 mL/min/1.73 m^2 Final     Comment:     Calculation used to obtain the estimated glomerular filtration  rate (eGFR) is the CKD-EPI equation.      06/10/2019 57.6 (A) >60 mL/min/1.73 m^2 Final     Comment:     Calculation used to obtain the estimated glomerular filtration  rate (eGFR) is the CKD-EPI equation.        eGFR if     Date Value Ref Range Status   05/06/2020 >60 >60 mL/min/1.73 m^2 Final   07/11/2019 59 (A) >60 mL/min/1.73 m^2 Final   06/10/2019 >60.0 >60 mL/min/1.73 m^2 Final              Passed - WBC in normal range and within 360 days     WBC   Date Value Ref Range Status   05/06/2020 6.97 3.90 - 12.70 K/uL Final   07/11/2019 7.26 3.90 - 12.70 K/uL Final   06/10/2019 6.56 3.90 - 12.70 K/uL Final                  Appointments  past 12m or future 3m with PCP    Date Provider   Last Visit   9/4/2019 Roverto Davis MD   Next Visit   11/11/2020 Roverto Davis MD   ED visits in past 90 days: 0     Note composed:12:24 PM 10/30/2020

## 2020-10-30 NOTE — TELEPHONE ENCOUNTER
Provider Staff:     Action is required for this patient.     Please schedule patient for Labs (CMP/lipids/a1c.  Please review patient's upcoming appointments as patient has 2 appts on same day)    Thanks!  [facility]     Appointments  past 12m or future 3m with PCP    Date Provider   Last Visit   9/4/2019 Roverto Davis MD   Next Visit   11/11/2020 Roverto Davis MD     Note composed:12:24 PM 10/30/2020

## 2020-11-13 ENCOUNTER — DOCUMENTATION ONLY (OUTPATIENT)
Dept: FAMILY MEDICINE | Facility: CLINIC | Age: 70
End: 2020-11-13

## 2020-11-17 ENCOUNTER — PATIENT OUTREACH (OUTPATIENT)
Dept: ADMINISTRATIVE | Facility: HOSPITAL | Age: 70
End: 2020-11-17

## 2020-11-17 NOTE — LETTER
November 25, 2020    Inge Monge  [address] [address] [address]             [facility]  [address]  [facility] [address]  Phone: [phone] Dear Lora, [facility] is committed to your overall health. Currently, we have Roverto Davis MD listed as your primary care provider. If this is incorrect, please let us know by emailing or contacting our office at [phone] so we can update our records.     Our records show you have not been seen in twelve months by your primary care provider. Please schedule online through your [email or web address]  account or call our office at [phone].   You may also be due for the following test and/or procedures:     Colon cancer screening   Annual Dilated Eye Exam   Diabetic lab testing   Mammogram   Diabetic Foot Exam   Influenza Vaccine     If you already have any of the above scheduled, please disregard this message.  If you have had any of the above done at a non-[facility] facility, please notify us so that we can obtain a copy or bring a copy to your next Primary Care visit.     Your [facility] care team is committed to supporting your overall health. We look forward to seeing you soon and appreciate the opportunity to serve you.       Sincerely,     Roverto Davis MD and your [facility]

## 2020-11-20 ENCOUNTER — PATIENT OUTREACH (OUTPATIENT)
Dept: ADMINISTRATIVE | Facility: HOSPITAL | Age: 70
End: 2020-11-20

## 2020-11-24 ENCOUNTER — PATIENT OUTREACH (OUTPATIENT)
Dept: ADMINISTRATIVE | Facility: CLINIC | Age: 70
End: 2020-11-24

## 2020-11-24 ENCOUNTER — EXTERNAL HOSPITAL ADMISSION (OUTPATIENT)
Dept: ADMINISTRATIVE | Facility: CLINIC | Age: 70
End: 2020-11-24

## 2020-12-04 ENCOUNTER — PATIENT OUTREACH (OUTPATIENT)
Dept: ADMINISTRATIVE | Facility: HOSPITAL | Age: 70
End: 2020-12-04

## 2020-12-04 NOTE — PROGRESS NOTES
Non-compliant GAP report chart review - Chart review completed for the following HM test if overdue  ( Dilated EYE EXAM)  12/04/2020    Care Everywhere and Media reports - updates requested and reviewed.        EYE EXAM REPORT

## 2020-12-09 ENCOUNTER — CLINICAL SUPPORT (OUTPATIENT)
Dept: CARDIOLOGY | Facility: CLINIC | Age: 70
End: 2020-12-09
Payer: MEDICARE

## 2020-12-09 DIAGNOSIS — Z95.810 PRESENCE OF AUTOMATIC (IMPLANTABLE) CARDIAC DEFIBRILLATOR: ICD-10-CM

## 2020-12-09 PROCEDURE — 93295 DEV INTERROG REMOTE 1/2/MLT: CPT | Mod: ,,, | Performed by: INTERNAL MEDICINE

## 2020-12-09 PROCEDURE — 93295 CARDIAC DEVICE CHECK - REMOTE: ICD-10-PCS | Mod: ,,, | Performed by: INTERNAL MEDICINE

## 2020-12-09 PROCEDURE — 93296 REM INTERROG EVL PM/IDS: CPT | Mod: PBBFAC,PO | Performed by: INTERNAL MEDICINE

## 2020-12-11 ENCOUNTER — PATIENT MESSAGE (OUTPATIENT)
Dept: OTHER | Facility: OTHER | Age: 70
End: 2020-12-11

## 2021-02-18 ENCOUNTER — TELEPHONE (OUTPATIENT)
Dept: CARDIOLOGY | Facility: CLINIC | Age: 71
End: 2021-02-18

## 2021-02-23 ENCOUNTER — PATIENT OUTREACH (OUTPATIENT)
Dept: ADMINISTRATIVE | Facility: HOSPITAL | Age: 71
End: 2021-02-23

## 2021-02-24 ENCOUNTER — TELEPHONE (OUTPATIENT)
Dept: CARDIOLOGY | Facility: CLINIC | Age: 71
End: 2021-02-24

## 2021-03-08 ENCOUNTER — TELEPHONE (OUTPATIENT)
Dept: CARDIOLOGY | Facility: CLINIC | Age: 71
End: 2021-03-08

## 2022-07-15 NOTE — TELEPHONE ENCOUNTER
----- Message from Deanna Mittal sent at 7/13/2017 10:10 AM CDT -----  Contact: self  Patient a new prescription for Cyclobenzaprine 10 mg   For Dr. Masterson new prescriptions on Verapamil  240 mg and Metoprolol 200 mg called to [facility] in [place]     If any questions please call  to advise.     Thanks   
Patient advised refill sent to pharmacy by Dr Davis.  Also advised her that Dr Masterson did send refills on her metoprolol and verapamil to pharmacy on 7/5/2017.  Voices understanding.  
Refill for cyclobenzaprine pended for review.  Appointment scheduled 9/6/17.  
Statement Selected

## 2023-05-09 NOTE — PROGRESS NOTES
Non-compliant report chart audits. Chart review completed for HM test overdue (mammograms, Colonoscopies, pap smears, DM labs, and/or EYE EXAMs)  06/15/2020    Care Everywhere and media, updates requested and reviewed.         Patient updated.  Caro HOUGH RN